# Patient Record
Sex: MALE | Race: WHITE | URBAN - METROPOLITAN AREA
[De-identification: names, ages, dates, MRNs, and addresses within clinical notes are randomized per-mention and may not be internally consistent; named-entity substitution may affect disease eponyms.]

---

## 2022-05-16 ENCOUNTER — OFFICE VISIT (OUTPATIENT)
Dept: CARDIOLOGY CLINIC | Age: 52
End: 2022-05-16
Payer: COMMERCIAL

## 2022-05-16 ENCOUNTER — TELEPHONE (OUTPATIENT)
Dept: CARDIOLOGY CLINIC | Age: 52
End: 2022-05-16

## 2022-05-16 VITALS
WEIGHT: 177 LBS | BODY MASS INDEX: 24.78 KG/M2 | DIASTOLIC BLOOD PRESSURE: 70 MMHG | RESPIRATION RATE: 18 BRPM | SYSTOLIC BLOOD PRESSURE: 136 MMHG | OXYGEN SATURATION: 99 % | HEIGHT: 71 IN | HEART RATE: 90 BPM

## 2022-05-16 DIAGNOSIS — H33.21 RIGHT RETINAL DETACHMENT: ICD-10-CM

## 2022-05-16 DIAGNOSIS — I65.29: Primary | ICD-10-CM

## 2022-05-16 DIAGNOSIS — E10.9 INSULIN DEPENDENT DIABETES MELLITUS TYPE IA (HCC): ICD-10-CM

## 2022-05-16 DIAGNOSIS — R06.09 DOE (DYSPNEA ON EXERTION): ICD-10-CM

## 2022-05-16 PROCEDURE — 99204 OFFICE O/P NEW MOD 45 MIN: CPT | Performed by: SPECIALIST

## 2022-05-16 PROCEDURE — 93000 ELECTROCARDIOGRAM COMPLETE: CPT | Performed by: SPECIALIST

## 2022-05-16 RX ORDER — ATORVASTATIN CALCIUM 10 MG/1
10 TABLET, FILM COATED ORAL DAILY
COMMUNITY
Start: 2022-05-13

## 2022-05-16 RX ORDER — BLOOD-GLUCOSE TRANSMITTER
EACH MISCELLANEOUS SEE ADMIN INSTRUCTIONS
COMMUNITY
Start: 2022-05-13

## 2022-05-16 RX ORDER — FENOFIBRATE 145 MG/1
145 TABLET, COATED ORAL DAILY
COMMUNITY

## 2022-05-16 RX ORDER — BLOOD-GLUCOSE SENSOR
EACH MISCELLANEOUS
COMMUNITY
Start: 2022-05-13

## 2022-05-16 RX ORDER — INSULIN LISPRO 100 [IU]/ML
INJECTION, SOLUTION INTRAVENOUS; SUBCUTANEOUS
COMMUNITY
Start: 2022-03-15

## 2022-05-16 RX ORDER — LEVOTHYROXINE SODIUM 150 MCG
TABLET ORAL
COMMUNITY
Start: 2022-05-13

## 2022-05-16 NOTE — PROGRESS NOTES
HISTORY OF PRESENT ILLNESS  Reed Cast is a 46 y.o. male. He has a history of diabetes since age 9 on insulin. In January he had a right retinal detachment. He then subsequently had some transient decrease in vision in that eye. There was question that he might be having some embolic phenomenon causing vascular issues. Because of his diabetes he has been tried in the past on lisinopril but could not tolerate the medication due to low blood pressure at home. He does not smoke cigarettes or drink alcohol and denies palpitations. 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 He thinks his hemoglobin A1c is around 7. HPI  Patient Active Problem List   Diagnosis Code    Insulin dependent diabetes mellitus type IA (HCC) E10.9    Right retinal detachment H33.21     Current Outpatient Medications   Medication Sig Dispense Refill    fenofibrate nanocrystallized (TRICOR) 145 mg tablet Take 145 mg by mouth daily.  atorvastatin (LIPITOR) 10 mg tablet Take 10 mg by mouth daily.  Dexcom G6 Sensor hansel USE AS DIRECTED EVERY 10 DAYS      Dexcom G6 Transmitter hansel See Admin Instructions.  HumaLOG U-100 Insulin 100 unit/mL injection       Synthroid 150 mcg tablet        Past Medical History:   Diagnosis Date    Diabetes ([facility] [address]        No past surgical history on file. Review of Systems   Eyes: Positive for blurred vision. All other systems reviewed and are negative. Visit Vitals  /70 (BP 1 Location: Right arm, BP Patient Position: Sitting, BP Cuff Size: Adult)   Pulse 90   Resp 18   Ht 5' 11\" (1.803 m)   Wt 177 lb (80.3 kg)   SpO2 99%   BMI 24.69 kg/m²       Physical Exam  Vitals and nursing note reviewed. Constitutional:       Appearance: He is normal weight. HENT:      Head: Normocephalic.       Right Ear: External ear normal.      Left Ear: External ear normal.      Nose: Nose normal. Mouth/Throat:      Mouth: Mucous membranes are moist.   Eyes:      Extraocular Movements: Extraocular movements intact. Cardiovascular:      Rate and Rhythm: Normal rate and regular rhythm. Heart sounds: Normal heart sounds. Pulmonary:      Breath sounds: Normal breath sounds. Abdominal:      Palpations: Abdomen is soft. Musculoskeletal:         General: Normal range of motion. Cervical back: Normal range of motion. Skin:     General: Skin is warm. Neurological:      Mental Status: He is alert and oriented to person, place, and time. Psychiatric:         Behavior: Behavior normal.         ASSESSMENT and PLAN  His EKG and exam are unremarkable. It is possible that he has had repetitive embolic phenomenon to the right eye but I doubt it. Nevertheless I will have him return for a transthoracic echocardiogram and carotid duplex examination we will call him regarding the results and any potential need for follow-up.

## 2022-06-22 ENCOUNTER — ANCILLARY PROCEDURE (OUTPATIENT)
Dept: CARDIOLOGY CLINIC | Age: 52
End: 2022-06-22
Payer: COMMERCIAL

## 2022-06-22 ENCOUNTER — ANCILLARY PROCEDURE (OUTPATIENT)
Dept: CARDIOLOGY CLINIC | Age: 52
End: 2022-06-22

## 2022-06-22 VITALS
WEIGHT: 177 LBS | DIASTOLIC BLOOD PRESSURE: 70 MMHG | SYSTOLIC BLOOD PRESSURE: 136 MMHG | BODY MASS INDEX: 24.78 KG/M2 | HEIGHT: 71 IN

## 2022-06-22 DIAGNOSIS — R06.09 DOE (DYSPNEA ON EXERTION): ICD-10-CM

## 2022-06-22 DIAGNOSIS — I65.29: ICD-10-CM

## 2022-06-22 PROCEDURE — 93880 EXTRACRANIAL BILAT STUDY: CPT | Performed by: SPECIALIST

## 2022-06-22 PROCEDURE — 93306 TTE W/DOPPLER COMPLETE: CPT | Performed by: SPECIALIST

## 2022-06-23 ENCOUNTER — TELEPHONE (OUTPATIENT)
Dept: CARDIOLOGY CLINIC | Age: 52
End: 2022-06-23

## 2022-06-23 LAB
ECHO AO ROOT DIAM: 3.1 CM
ECHO AO ROOT INDEX: 1.55 CM/M2
ECHO AV PEAK GRADIENT: 7 MMHG
ECHO AV PEAK VELOCITY: 1.3 M/S
ECHO AV VELOCITY RATIO: 0.62
ECHO LA DIAMETER INDEX: 1.8 CM/M2
ECHO LA DIAMETER: 3.6 CM
ECHO LA TO AORTIC ROOT RATIO: 1.16
ECHO LA VOL 2C: 39 ML (ref 18–58)
ECHO LA VOL 4C: 53 ML (ref 18–58)
ECHO LA VOL BP: 46 ML (ref 18–58)
ECHO LA VOL/BSA BIPLANE: 23 ML/M2 (ref 16–34)
ECHO LA VOLUME AREA LENGTH: 52 ML
ECHO LA VOLUME INDEX A2C: 20 ML/M2 (ref 16–34)
ECHO LA VOLUME INDEX A4C: 27 ML/M2 (ref 16–34)
ECHO LA VOLUME INDEX AREA LENGTH: 26 ML/M2 (ref 16–34)
ECHO LV E' LATERAL VELOCITY: 12 CM/S
ECHO LV E' SEPTAL VELOCITY: 8 CM/S
ECHO LV FRACTIONAL SHORTENING: 36 % (ref 28–44)
ECHO LV INTERNAL DIMENSION DIASTOLE INDEX: 2.25 CM/M2
ECHO LV INTERNAL DIMENSION DIASTOLIC: 4.5 CM (ref 4.2–5.9)
ECHO LV INTERNAL DIMENSION SYSTOLIC INDEX: 1.45 CM/M2
ECHO LV INTERNAL DIMENSION SYSTOLIC: 2.9 CM
ECHO LV ISOVOLUMETRIC RELAXATION TIME (IVRT): 70.4 MS
ECHO LV IVSD: 0.9 CM (ref 0.6–1)
ECHO LV MASS 2D: 142.9 G (ref 88–224)
ECHO LV MASS INDEX 2D: 71.4 G/M2 (ref 49–115)
ECHO LV POSTERIOR WALL DIASTOLIC: 1 CM (ref 0.6–1)
ECHO LV RELATIVE WALL THICKNESS RATIO: 0.44
ECHO LVOT PEAK GRADIENT: 3 MMHG
ECHO LVOT PEAK VELOCITY: 0.8 M/S
ECHO MV "A" WAVE DURATION: 154.1 MSEC
ECHO MV A VELOCITY: 0.69 M/S
ECHO MV E DECELERATION TIME (DT): 218.4 MS
ECHO MV E VELOCITY: 0.58 M/S
ECHO MV E/A RATIO: 0.84
ECHO MV E/E' LATERAL: 4.83
ECHO MV E/E' RATIO (AVERAGED): 6.04
ECHO MV E/E' SEPTAL: 7.25
ECHO RV FREE WALL PEAK S': 16 CM/S
ECHO RV TAPSE: 2.8 CM (ref 1.7–?)
LEFT CCA DIST DIAS: 28.1 CM/S
LEFT CCA DIST SYS: 109 CM/S
LEFT CCA PROX DIAS: 19.5 CM/S
LEFT CCA PROX SYS: 102.9 CM/S
LEFT ECA DIAS: 8.5 CM/S
LEFT ECA SYS: 107.8 CM/S
LEFT ICA DIST DIAS: 34.6 CM/S
LEFT ICA DIST SYS: 90.1 CM/S
LEFT ICA MID DIAS: 24.4 CM/S
LEFT ICA MID SYS: 77.1 CM/S
LEFT ICA PROX DIAS: 18.3 CM/S
LEFT ICA PROX SYS: 73.4 CM/S
LEFT ICA/CCA SYS: 0.8 NO UNITS
LEFT VERTEBRAL DIAS: 20.6 CM/S
LEFT VERTEBRAL SYS: 50.7 CM/S
RIGHT CCA DIST DIAS: 13.4 CM/S
RIGHT CCA DIST SYS: 80.9 CM/S
RIGHT CCA PROX DIAS: 19.9 CM/S
RIGHT CCA PROX SYS: 125.8 CM/S
RIGHT ECA DIAS: 4.8 CM/S
RIGHT ECA SYS: 83.4 CM/S
RIGHT ICA DIST DIAS: 35.5 CM/S
RIGHT ICA DIST SYS: 87 CM/S
RIGHT ICA MID DIAS: 19.5 CM/S
RIGHT ICA MID SYS: 68.7 CM/S
RIGHT ICA PROX DIAS: 18.3 CM/S
RIGHT ICA PROX SYS: 74.8 CM/S
RIGHT ICA/CCA SYS: 0.7 NO UNITS
RIGHT VERTEBRAL DIAS: 15.8 CM/S
RIGHT VERTEBRAL SYS: 61.2 CM/S

## 2022-06-23 NOTE — TELEPHONE ENCOUNTER
----- Message from Thang Blackburn MD sent at 6/23/2022  1:05 PM EDT -----  Echo and carotids normal, no evidence for cardiovascular source causing recent episode

## 2023-10-10 ENCOUNTER — APPOINTMENT (OUTPATIENT)
Facility: HOSPITAL | Age: 53
End: 2023-10-10
Payer: COMMERCIAL

## 2023-10-10 ENCOUNTER — HOSPITAL ENCOUNTER (INPATIENT)
Facility: HOSPITAL | Age: 53
LOS: 3 days | Discharge: HOME HEALTH | End: 2023-10-13
Attending: STUDENT IN AN ORGANIZED HEALTH CARE EDUCATION/TRAINING PROGRAM | Admitting: INTERNAL MEDICINE
Payer: COMMERCIAL

## 2023-10-10 DIAGNOSIS — T68.XXXA HYPOTHERMIA, INITIAL ENCOUNTER: ICD-10-CM

## 2023-10-10 DIAGNOSIS — R79.89 ELEVATED TSH: ICD-10-CM

## 2023-10-10 DIAGNOSIS — R94.31 ABNORMAL ECG: Primary | ICD-10-CM

## 2023-10-10 DIAGNOSIS — R41.82 ALTERED MENTAL STATUS, UNSPECIFIED ALTERED MENTAL STATUS TYPE: ICD-10-CM

## 2023-10-10 DIAGNOSIS — R65.21 SEPTIC SHOCK (HCC): ICD-10-CM

## 2023-10-10 DIAGNOSIS — E87.5 HYPERKALEMIA: ICD-10-CM

## 2023-10-10 DIAGNOSIS — A41.9 SEPTIC SHOCK (HCC): ICD-10-CM

## 2023-10-10 DIAGNOSIS — E10.11 TYPE 1 DIABETES MELLITUS WITH KETOACIDOTIC COMA (HCC): ICD-10-CM

## 2023-10-10 DIAGNOSIS — N17.9 AKI (ACUTE KIDNEY INJURY) (HCC): ICD-10-CM

## 2023-10-10 DIAGNOSIS — I95.9 HYPOTENSION, UNSPECIFIED HYPOTENSION TYPE: ICD-10-CM

## 2023-10-10 DIAGNOSIS — S91.309A OPEN WOUND OF FOOT EXCLUDING TOES: ICD-10-CM

## 2023-10-10 LAB
ALBUMIN SERPL-MCNC: 3.5 G/DL (ref 3.5–5)
ALBUMIN/GLOB SERPL: 0.9 (ref 1.1–2.2)
ALP SERPL-CCNC: 124 U/L (ref 45–117)
ALT SERPL-CCNC: 30 U/L (ref 12–78)
AMPHET UR QL SCN: NEGATIVE
ANION GAP BLD CALC-SCNC: ABNORMAL MMOL/L (ref 10–20)
ANION GAP SERPL CALC-SCNC: 14 MMOL/L (ref 5–15)
ANION GAP SERPL CALC-SCNC: 25 MMOL/L (ref 5–15)
ANION GAP SERPL CALC-SCNC: 35 MMOL/L (ref 5–15)
ANION GAP SERPL CALC-SCNC: ABNORMAL MMOL/L (ref 5–15)
APAP SERPL-MCNC: <2 UG/ML (ref 10–30)
APPEARANCE UR: CLEAR
AST SERPL-CCNC: 22 U/L (ref 15–37)
BACTERIA URNS QL MICRO: NEGATIVE /HPF
BARBITURATES UR QL SCN: NEGATIVE
BASE DEFICIT BLDV-SCNC: 29.5 MMOL/L
BASOPHILS # BLD: 0 K/UL (ref 0–0.1)
BASOPHILS NFR BLD: 0 % (ref 0–1)
BENZODIAZ UR QL: NEGATIVE
BILIRUB SERPL-MCNC: 0.5 MG/DL (ref 0.2–1)
BILIRUB UR QL: NEGATIVE
BUN SERPL-MCNC: 57 MG/DL (ref 6–20)
BUN SERPL-MCNC: 61 MG/DL (ref 6–20)
BUN SERPL-MCNC: 65 MG/DL (ref 6–20)
BUN SERPL-MCNC: 65 MG/DL (ref 6–20)
BUN/CREAT SERPL: 20 (ref 12–20)
BUN/CREAT SERPL: 20 (ref 12–20)
BUN/CREAT SERPL: 21 (ref 12–20)
BUN/CREAT SERPL: 22 (ref 12–20)
CA-I BLD-MCNC: 1.13 MMOL/L (ref 1.12–1.32)
CALCIUM SERPL-MCNC: 8.7 MG/DL (ref 8.5–10.1)
CALCIUM SERPL-MCNC: 8.9 MG/DL (ref 8.5–10.1)
CALCIUM SERPL-MCNC: 9 MG/DL (ref 8.5–10.1)
CALCIUM SERPL-MCNC: 9.2 MG/DL (ref 8.5–10.1)
CANNABINOIDS UR QL SCN: NEGATIVE
CHLORIDE BLD-SCNC: 96 MMOL/L (ref 98–107)
CHLORIDE SERPL-SCNC: 101 MMOL/L (ref 97–108)
CHLORIDE SERPL-SCNC: 85 MMOL/L (ref 97–108)
CHLORIDE SERPL-SCNC: 85 MMOL/L (ref 97–108)
CHLORIDE SERPL-SCNC: 94 MMOL/L (ref 97–108)
CK SERPL-CCNC: 254 U/L (ref 39–308)
CK SERPL-CCNC: 638 U/L (ref 39–308)
CO2 BLD-SCNC: <5 MMOL/L (ref 21–32)
CO2 SERPL-SCNC: 13 MMOL/L (ref 21–32)
CO2 SERPL-SCNC: 23 MMOL/L (ref 21–32)
CO2 SERPL-SCNC: 5 MMOL/L (ref 21–32)
CO2 SERPL-SCNC: <5 MMOL/L (ref 21–32)
COCAINE UR QL SCN: NEGATIVE
COLOR UR: ABNORMAL
CORTIS SERPL-MCNC: 86.3 UG/DL
CREAT BLD-MCNC: 2.55 MG/DL (ref 0.6–1.3)
CREAT SERPL-MCNC: 2.61 MG/DL (ref 0.7–1.3)
CREAT SERPL-MCNC: 2.91 MG/DL (ref 0.7–1.3)
CREAT SERPL-MCNC: 3.18 MG/DL (ref 0.7–1.3)
CREAT SERPL-MCNC: 3.28 MG/DL (ref 0.7–1.3)
DIFFERENTIAL METHOD BLD: ABNORMAL
EKG ATRIAL RATE: 70 BPM
EKG DIAGNOSIS: NORMAL
EKG P AXIS: 65 DEGREES
EKG P-R INTERVAL: 188 MS
EKG Q-T INTERVAL: 466 MS
EKG QRS DURATION: 128 MS
EKG QTC CALCULATION (BAZETT): 503 MS
EKG R AXIS: 95 DEGREES
EKG T AXIS: -10 DEGREES
EKG VENTRICULAR RATE: 70 BPM
EOSINOPHIL # BLD: 0 K/UL (ref 0–0.4)
EOSINOPHIL NFR BLD: 0 % (ref 0–7)
EPITH CASTS URNS QL MICRO: ABNORMAL /LPF
ERYTHROCYTE [DISTWIDTH] IN BLOOD BY AUTOMATED COUNT: 12.2 % (ref 11.5–14.5)
ETHANOL SERPL-MCNC: <10 MG/DL (ref 0–0.08)
GLOBULIN SER CALC-MCNC: 4.1 G/DL (ref 2–4)
GLUCOSE BLD STRIP.AUTO-MCNC: 371 MG/DL (ref 65–117)
GLUCOSE BLD STRIP.AUTO-MCNC: 476 MG/DL (ref 65–117)
GLUCOSE BLD STRIP.AUTO-MCNC: >600 MG/DL (ref 65–117)
GLUCOSE BLD-MCNC: >700 MG/DL (ref 65–100)
GLUCOSE SERPL-MCNC: 1204 MG/DL (ref 65–100)
GLUCOSE SERPL-MCNC: 1275 MG/DL (ref 65–100)
GLUCOSE SERPL-MCNC: 481 MG/DL (ref 65–100)
GLUCOSE SERPL-MCNC: 859 MG/DL (ref 65–100)
GLUCOSE UR STRIP.AUTO-MCNC: >1000 MG/DL
HCO3 BLDV-SCNC: 3.7 MMOL/L (ref 23–28)
HCT VFR BLD AUTO: 38.3 % (ref 36.6–50.3)
HGB BLD-MCNC: 11.4 G/DL (ref 12.1–17)
HGB UR QL STRIP: ABNORMAL
IMM GRANULOCYTES # BLD AUTO: 0 K/UL
IMM GRANULOCYTES NFR BLD AUTO: 0 %
INR PPP: 1.1 (ref 0.9–1.1)
KETONES UR QL STRIP.AUTO: 40 MG/DL
KETONES UR QL: 40 MG/DL
LACTATE SERPL-SCNC: 2.2 MMOL/L (ref 0.4–2)
LACTATE SERPL-SCNC: 2.4 MMOL/L (ref 0.4–2)
LACTATE SERPL-SCNC: 2.5 MMOL/L (ref 0.4–2)
LACTATE SERPL-SCNC: 5.6 MMOL/L (ref 0.4–2)
LACTATE SERPL-SCNC: 7.9 MMOL/L (ref 0.4–2)
LEUKOCYTE ESTERASE UR QL STRIP.AUTO: NEGATIVE
LYMPHOCYTES # BLD: 2.4 K/UL (ref 0.8–3.5)
LYMPHOCYTES NFR BLD: 11 % (ref 12–49)
Lab: NORMAL
MAGNESIUM SERPL-MCNC: 2.5 MG/DL (ref 1.6–2.4)
MAGNESIUM SERPL-MCNC: 3 MG/DL (ref 1.6–2.4)
MCH RBC QN AUTO: 30.2 PG (ref 26–34)
MCHC RBC AUTO-ENTMCNC: 29.8 G/DL (ref 30–36.5)
MCV RBC AUTO: 101.3 FL (ref 80–99)
METAMYELOCYTES NFR BLD MANUAL: 5 %
METHADONE UR QL: NEGATIVE
MONOCYTES # BLD: 1.5 K/UL (ref 0–1)
MONOCYTES NFR BLD: 7 % (ref 5–13)
NEUTS SEG # BLD: 16.8 K/UL (ref 1.8–8)
NEUTS SEG NFR BLD: 77 % (ref 32–75)
NITRITE UR QL STRIP.AUTO: NEGATIVE
NRBC # BLD: 0 K/UL (ref 0–0.01)
NRBC BLD-RTO: 0 PER 100 WBC
OPIATES UR QL: NEGATIVE
PCO2 BLDV: 22 MMHG (ref 41–51)
PCP UR QL: NEGATIVE
PH BLDV: 6.83 (ref 7.32–7.42)
PH UR STRIP: 5.5 (ref 5–8)
PHOSPHATE SERPL-MCNC: 0.8 MG/DL (ref 2.6–4.7)
PHOSPHATE SERPL-MCNC: >13.5 MG/DL (ref 2.6–4.7)
PLATELET # BLD AUTO: 331 K/UL (ref 150–400)
PMV BLD AUTO: 11 FL (ref 8.9–12.9)
PO2 BLDV: 67 MMHG (ref 25–40)
POTASSIUM BLD-SCNC: 6.7 MMOL/L (ref 3.5–5.1)
POTASSIUM SERPL-SCNC: 2.8 MMOL/L (ref 3.5–5.1)
POTASSIUM SERPL-SCNC: 3.4 MMOL/L (ref 3.5–5.1)
POTASSIUM SERPL-SCNC: 6.7 MMOL/L (ref 3.5–5.1)
POTASSIUM SERPL-SCNC: 6.8 MMOL/L (ref 3.5–5.1)
PROCALCITONIN SERPL-MCNC: 4.49 NG/ML
PROCALCITONIN SERPL-MCNC: 5.68 NG/ML
PROT SERPL-MCNC: 7.6 G/DL (ref 6.4–8.2)
PROT UR STRIP-MCNC: ABNORMAL MG/DL
PROTHROMBIN TIME: 11 SEC (ref 9–11.1)
RBC # BLD AUTO: 3.78 M/UL (ref 4.1–5.7)
RBC #/AREA URNS HPF: ABNORMAL /HPF (ref 0–5)
RBC MORPH BLD: ABNORMAL
SALICYLATES SERPL-MCNC: 6.3 MG/DL (ref 2.8–20)
SAO2 % BLDV: 72.3 % (ref 65–88)
SERVICE CMNT-IMP: ABNORMAL
SODIUM BLD-SCNC: 120 MMOL/L (ref 136–145)
SODIUM SERPL-SCNC: 125 MMOL/L (ref 136–145)
SODIUM SERPL-SCNC: 127 MMOL/L (ref 136–145)
SODIUM SERPL-SCNC: 132 MMOL/L (ref 136–145)
SODIUM SERPL-SCNC: 138 MMOL/L (ref 136–145)
SP GR UR REFRACTOMETRY: 1.02 (ref 1–1.03)
SPECIMEN TYPE: ABNORMAL
T3FREE SERPL-MCNC: 1.3 PG/ML (ref 2.2–4)
T4 FREE SERPL-MCNC: 1 NG/DL (ref 0.8–1.5)
TROPONIN I SERPL HS-MCNC: 1277 NG/L (ref 0–76)
TROPONIN I SERPL HS-MCNC: 360 NG/L (ref 0–76)
TROPONIN I SERPL HS-MCNC: 5504 NG/L (ref 0–76)
TROPONIN I SERPL HS-MCNC: ABNORMAL NG/L (ref 0–76)
TSH SERPL DL<=0.05 MIU/L-ACNC: 10.57 UIU/ML (ref 0.36–3.74)
URINE CULTURE IF INDICATED: ABNORMAL
UROBILINOGEN UR QL STRIP.AUTO: 0.2 EU/DL (ref 0.2–1)
WBC # BLD AUTO: 21.8 K/UL (ref 4.1–11.1)
WBC URNS QL MICRO: ABNORMAL /HPF (ref 0–4)

## 2023-10-10 PROCEDURE — 2580000003 HC RX 258: Performed by: STUDENT IN AN ORGANIZED HEALTH CARE EDUCATION/TRAINING PROGRAM

## 2023-10-10 PROCEDURE — 2500000003 HC RX 250 WO HCPCS: Performed by: INTERNAL MEDICINE

## 2023-10-10 PROCEDURE — 99221 1ST HOSP IP/OBS SF/LOW 40: CPT | Performed by: CLINICAL NURSE SPECIALIST

## 2023-10-10 PROCEDURE — A4216 STERILE WATER/SALINE, 10 ML: HCPCS | Performed by: NURSE PRACTITIONER

## 2023-10-10 PROCEDURE — 6360000002 HC RX W HCPCS: Performed by: NURSE PRACTITIONER

## 2023-10-10 PROCEDURE — 82962 GLUCOSE BLOOD TEST: CPT

## 2023-10-10 PROCEDURE — 84443 ASSAY THYROID STIM HORMONE: CPT

## 2023-10-10 PROCEDURE — 84145 PROCALCITONIN (PCT): CPT

## 2023-10-10 PROCEDURE — 70450 CT HEAD/BRAIN W/O DYE: CPT

## 2023-10-10 PROCEDURE — 93005 ELECTROCARDIOGRAM TRACING: CPT | Performed by: NURSE PRACTITIONER

## 2023-10-10 PROCEDURE — 84100 ASSAY OF PHOSPHORUS: CPT

## 2023-10-10 PROCEDURE — 82533 TOTAL CORTISOL: CPT

## 2023-10-10 PROCEDURE — 83605 ASSAY OF LACTIC ACID: CPT

## 2023-10-10 PROCEDURE — 93005 ELECTROCARDIOGRAM TRACING: CPT | Performed by: STUDENT IN AN ORGANIZED HEALTH CARE EDUCATION/TRAINING PROGRAM

## 2023-10-10 PROCEDURE — 85025 COMPLETE CBC W/AUTO DIFF WBC: CPT

## 2023-10-10 PROCEDURE — 84439 ASSAY OF FREE THYROXINE: CPT

## 2023-10-10 PROCEDURE — 2000000000 HC ICU R&B

## 2023-10-10 PROCEDURE — 6360000002 HC RX W HCPCS: Performed by: STUDENT IN AN ORGANIZED HEALTH CARE EDUCATION/TRAINING PROGRAM

## 2023-10-10 PROCEDURE — 81001 URINALYSIS AUTO W/SCOPE: CPT

## 2023-10-10 PROCEDURE — 73630 X-RAY EXAM OF FOOT: CPT

## 2023-10-10 PROCEDURE — 83735 ASSAY OF MAGNESIUM: CPT

## 2023-10-10 PROCEDURE — 2500000003 HC RX 250 WO HCPCS: Performed by: NURSE PRACTITIONER

## 2023-10-10 PROCEDURE — 80143 DRUG ASSAY ACETAMINOPHEN: CPT

## 2023-10-10 PROCEDURE — 74176 CT ABD & PELVIS W/O CONTRAST: CPT

## 2023-10-10 PROCEDURE — 6370000000 HC RX 637 (ALT 250 FOR IP): Performed by: STUDENT IN AN ORGANIZED HEALTH CARE EDUCATION/TRAINING PROGRAM

## 2023-10-10 PROCEDURE — 85610 PROTHROMBIN TIME: CPT

## 2023-10-10 PROCEDURE — 36415 COLL VENOUS BLD VENIPUNCTURE: CPT

## 2023-10-10 PROCEDURE — 51702 INSERT TEMP BLADDER CATH: CPT

## 2023-10-10 PROCEDURE — 80047 BASIC METABLC PNL IONIZED CA: CPT

## 2023-10-10 PROCEDURE — 96365 THER/PROPH/DIAG IV INF INIT: CPT

## 2023-10-10 PROCEDURE — 99285 EMERGENCY DEPT VISIT HI MDM: CPT

## 2023-10-10 PROCEDURE — 82077 ASSAY SPEC XCP UR&BREATH IA: CPT

## 2023-10-10 PROCEDURE — 71045 X-RAY EXAM CHEST 1 VIEW: CPT

## 2023-10-10 PROCEDURE — 2580000003 HC RX 258: Performed by: NURSE PRACTITIONER

## 2023-10-10 PROCEDURE — 80307 DRUG TEST PRSMV CHEM ANLYZR: CPT

## 2023-10-10 PROCEDURE — 2580000003 HC RX 258: Performed by: INTERNAL MEDICINE

## 2023-10-10 PROCEDURE — 80179 DRUG ASSAY SALICYLATE: CPT

## 2023-10-10 PROCEDURE — 80053 COMPREHEN METABOLIC PANEL: CPT

## 2023-10-10 PROCEDURE — 82803 BLOOD GASES ANY COMBINATION: CPT

## 2023-10-10 PROCEDURE — 96361 HYDRATE IV INFUSION ADD-ON: CPT

## 2023-10-10 PROCEDURE — 84484 ASSAY OF TROPONIN QUANT: CPT

## 2023-10-10 PROCEDURE — 87040 BLOOD CULTURE FOR BACTERIA: CPT

## 2023-10-10 PROCEDURE — 80048 BASIC METABOLIC PNL TOTAL CA: CPT

## 2023-10-10 PROCEDURE — 82550 ASSAY OF CK (CPK): CPT

## 2023-10-10 PROCEDURE — 84481 FREE ASSAY (FT-3): CPT

## 2023-10-10 PROCEDURE — 81002 URINALYSIS NONAUTO W/O SCOPE: CPT

## 2023-10-10 PROCEDURE — 2500000003 HC RX 250 WO HCPCS: Performed by: STUDENT IN AN ORGANIZED HEALTH CARE EDUCATION/TRAINING PROGRAM

## 2023-10-10 PROCEDURE — 93010 ELECTROCARDIOGRAM REPORT: CPT | Performed by: INTERNAL MEDICINE

## 2023-10-10 PROCEDURE — 96375 TX/PRO/DX INJ NEW DRUG ADDON: CPT

## 2023-10-10 RX ORDER — SODIUM CHLORIDE 9 MG/ML
INJECTION, SOLUTION INTRAVENOUS CONTINUOUS
Status: DISCONTINUED | OUTPATIENT
Start: 2023-10-10 | End: 2023-10-10

## 2023-10-10 RX ORDER — SODIUM CHLORIDE, SODIUM LACTATE, POTASSIUM CHLORIDE, AND CALCIUM CHLORIDE .6; .31; .03; .02 G/100ML; G/100ML; G/100ML; G/100ML
1000 INJECTION, SOLUTION INTRAVENOUS ONCE
Status: COMPLETED | OUTPATIENT
Start: 2023-10-10 | End: 2023-10-10

## 2023-10-10 RX ORDER — ACETAMINOPHEN 650 MG/1
650 SUPPOSITORY RECTAL EVERY 6 HOURS PRN
Status: DISCONTINUED | OUTPATIENT
Start: 2023-10-10 | End: 2023-10-13 | Stop reason: HOSPADM

## 2023-10-10 RX ORDER — SODIUM CHLORIDE 0.9 % (FLUSH) 0.9 %
5-40 SYRINGE (ML) INJECTION EVERY 12 HOURS SCHEDULED
Status: DISCONTINUED | OUTPATIENT
Start: 2023-10-10 | End: 2023-10-13 | Stop reason: HOSPADM

## 2023-10-10 RX ORDER — POTASSIUM CHLORIDE 7.45 MG/ML
10 INJECTION INTRAVENOUS
Status: DISCONTINUED | OUTPATIENT
Start: 2023-10-10 | End: 2023-10-11

## 2023-10-10 RX ORDER — CALCIUM GLUCONATE 94 MG/ML
2000 INJECTION, SOLUTION INTRAVENOUS ONCE
Status: DISCONTINUED | OUTPATIENT
Start: 2023-10-10 | End: 2023-10-10 | Stop reason: SDUPTHER

## 2023-10-10 RX ORDER — SODIUM CHLORIDE, SODIUM LACTATE, POTASSIUM CHLORIDE, AND CALCIUM CHLORIDE .6; .31; .03; .02 G/100ML; G/100ML; G/100ML; G/100ML
600 INJECTION, SOLUTION INTRAVENOUS ONCE
Status: COMPLETED | OUTPATIENT
Start: 2023-10-10 | End: 2023-10-10

## 2023-10-10 RX ORDER — ONDANSETRON 4 MG/1
4 TABLET, ORALLY DISINTEGRATING ORAL EVERY 8 HOURS PRN
Status: DISCONTINUED | OUTPATIENT
Start: 2023-10-10 | End: 2023-10-13 | Stop reason: HOSPADM

## 2023-10-10 RX ORDER — NOREPINEPHRINE BITARTRATE 0.06 MG/ML
1-100 INJECTION, SOLUTION INTRAVENOUS CONTINUOUS
Status: DISCONTINUED | OUTPATIENT
Start: 2023-10-10 | End: 2023-10-10 | Stop reason: SDUPTHER

## 2023-10-10 RX ORDER — SODIUM CHLORIDE 0.9 % (FLUSH) 0.9 %
5-40 SYRINGE (ML) INJECTION PRN
Status: DISCONTINUED | OUTPATIENT
Start: 2023-10-10 | End: 2023-10-13 | Stop reason: HOSPADM

## 2023-10-10 RX ORDER — DEXTROSE AND SODIUM CHLORIDE 5; .45 G/100ML; G/100ML
INJECTION, SOLUTION INTRAVENOUS CONTINUOUS PRN
Status: DISCONTINUED | OUTPATIENT
Start: 2023-10-10 | End: 2023-10-13 | Stop reason: HOSPADM

## 2023-10-10 RX ORDER — CALCIUM GLUCONATE 20 MG/ML
2000 INJECTION, SOLUTION INTRAVENOUS ONCE
Status: COMPLETED | OUTPATIENT
Start: 2023-10-10 | End: 2023-10-10

## 2023-10-10 RX ORDER — ONDANSETRON 2 MG/ML
4 INJECTION INTRAMUSCULAR; INTRAVENOUS EVERY 6 HOURS PRN
Status: DISCONTINUED | OUTPATIENT
Start: 2023-10-10 | End: 2023-10-13 | Stop reason: HOSPADM

## 2023-10-10 RX ORDER — SODIUM CHLORIDE 9 MG/ML
INJECTION, SOLUTION INTRAVENOUS PRN
Status: DISCONTINUED | OUTPATIENT
Start: 2023-10-10 | End: 2023-10-13 | Stop reason: HOSPADM

## 2023-10-10 RX ORDER — ACETAMINOPHEN 325 MG/1
650 TABLET ORAL EVERY 6 HOURS PRN
Status: DISCONTINUED | OUTPATIENT
Start: 2023-10-10 | End: 2023-10-13 | Stop reason: HOSPADM

## 2023-10-10 RX ORDER — MIDAZOLAM HYDROCHLORIDE 2 MG/2ML
1 INJECTION, SOLUTION INTRAMUSCULAR; INTRAVENOUS ONCE
Status: COMPLETED | OUTPATIENT
Start: 2023-10-11 | End: 2023-10-10

## 2023-10-10 RX ORDER — SODIUM CHLORIDE AND POTASSIUM CHLORIDE 150; 900 MG/100ML; MG/100ML
INJECTION, SOLUTION INTRAVENOUS CONTINUOUS
Status: DISCONTINUED | OUTPATIENT
Start: 2023-10-10 | End: 2023-10-11

## 2023-10-10 RX ORDER — ENOXAPARIN SODIUM 100 MG/ML
40 INJECTION SUBCUTANEOUS DAILY
Status: DISCONTINUED | OUTPATIENT
Start: 2023-10-10 | End: 2023-10-13 | Stop reason: HOSPADM

## 2023-10-10 RX ORDER — DEXTROSE MONOHYDRATE 100 MG/ML
INJECTION, SOLUTION INTRAVENOUS CONTINUOUS PRN
Status: DISCONTINUED | OUTPATIENT
Start: 2023-10-10 | End: 2023-10-13 | Stop reason: HOSPADM

## 2023-10-10 RX ORDER — FENTANYL CITRATE 50 UG/ML
25 INJECTION, SOLUTION INTRAMUSCULAR; INTRAVENOUS ONCE
Status: COMPLETED | OUTPATIENT
Start: 2023-10-11 | End: 2023-10-10

## 2023-10-10 RX ADMIN — VANCOMYCIN HYDROCHLORIDE 1000 MG: 1 INJECTION, POWDER, LYOPHILIZED, FOR SOLUTION INTRAVENOUS at 11:49

## 2023-10-10 RX ADMIN — POTASSIUM PHOSPHATE, MONOBASIC POTASSIUM PHOSPHATE, DIBASIC 30 MMOL: 224; 236 INJECTION, SOLUTION, CONCENTRATE INTRAVENOUS at 23:48

## 2023-10-10 RX ADMIN — POTASSIUM CHLORIDE 10 MEQ: 7.46 INJECTION, SOLUTION INTRAVENOUS at 21:29

## 2023-10-10 RX ADMIN — SODIUM CHLORIDE, PRESERVATIVE FREE 10 ML: 5 INJECTION INTRAVENOUS at 20:30

## 2023-10-10 RX ADMIN — SODIUM CHLORIDE: 9 INJECTION, SOLUTION INTRAVENOUS at 21:29

## 2023-10-10 RX ADMIN — SODIUM CHLORIDE, POTASSIUM CHLORIDE, SODIUM LACTATE AND CALCIUM CHLORIDE 1000 ML: 600; 310; 30; 20 INJECTION, SOLUTION INTRAVENOUS at 11:05

## 2023-10-10 RX ADMIN — SODIUM CHLORIDE, POTASSIUM CHLORIDE, SODIUM LACTATE AND CALCIUM CHLORIDE 1000 ML: 600; 310; 30; 20 INJECTION, SOLUTION INTRAVENOUS at 10:22

## 2023-10-10 RX ADMIN — MIDAZOLAM 1 MG: 1 INJECTION INTRAMUSCULAR; INTRAVENOUS at 23:45

## 2023-10-10 RX ADMIN — POTASSIUM CHLORIDE 10 MEQ: 7.46 INJECTION, SOLUTION INTRAVENOUS at 22:37

## 2023-10-10 RX ADMIN — WATER 2000 MG: 1 INJECTION INTRAMUSCULAR; INTRAVENOUS; SUBCUTANEOUS at 12:08

## 2023-10-10 RX ADMIN — CALCIUM GLUCONATE 2000 MG: 20 INJECTION, SOLUTION INTRAVENOUS at 11:41

## 2023-10-10 RX ADMIN — HYDROCORTISONE SODIUM SUCCINATE 100 MG: 100 INJECTION, POWDER, FOR SOLUTION INTRAMUSCULAR; INTRAVENOUS at 19:05

## 2023-10-10 RX ADMIN — SODIUM BICARBONATE 50 MEQ: 84 INJECTION, SOLUTION INTRAVENOUS at 10:56

## 2023-10-10 RX ADMIN — SODIUM CHLORIDE 32.5 UNITS/HR: 9 INJECTION, SOLUTION INTRAVENOUS at 17:52

## 2023-10-10 RX ADMIN — NOREPINEPHRINE BITARTRATE 5 MCG/MIN: 1 INJECTION, SOLUTION, CONCENTRATE INTRAVENOUS at 12:20

## 2023-10-10 RX ADMIN — SODIUM CHLORIDE 10.82 UNITS/HR: 9 INJECTION, SOLUTION INTRAVENOUS at 13:30

## 2023-10-10 RX ADMIN — POTASSIUM CHLORIDE 10 MEQ: 7.46 INJECTION, SOLUTION INTRAVENOUS at 23:48

## 2023-10-10 RX ADMIN — SODIUM BICARBONATE 50 MEQ: 84 INJECTION, SOLUTION INTRAVENOUS at 10:55

## 2023-10-10 RX ADMIN — SODIUM CHLORIDE 43.3 UNITS/HR: 9 INJECTION, SOLUTION INTRAVENOUS at 20:27

## 2023-10-10 RX ADMIN — FENTANYL CITRATE 25 MCG: 0.05 INJECTION, SOLUTION INTRAMUSCULAR; INTRAVENOUS at 23:45

## 2023-10-10 RX ADMIN — FAMOTIDINE 20 MG: 10 INJECTION, SOLUTION INTRAVENOUS at 18:06

## 2023-10-10 RX ADMIN — SODIUM BICARBONATE: 84 INJECTION, SOLUTION INTRAVENOUS at 16:19

## 2023-10-10 RX ADMIN — SODIUM CHLORIDE, POTASSIUM CHLORIDE, SODIUM LACTATE AND CALCIUM CHLORIDE 600 ML: 600; 310; 30; 20 INJECTION, SOLUTION INTRAVENOUS at 14:46

## 2023-10-10 RX ADMIN — SODIUM CHLORIDE 33.28 UNITS/HR: 9 INJECTION, SOLUTION INTRAVENOUS at 22:36

## 2023-10-10 RX ADMIN — VANCOMYCIN HYDROCHLORIDE 1000 MG: 1 INJECTION, POWDER, LYOPHILIZED, FOR SOLUTION INTRAVENOUS at 11:48

## 2023-10-10 ASSESSMENT — LIFESTYLE VARIABLES
HOW MANY STANDARD DRINKS CONTAINING ALCOHOL DO YOU HAVE ON A TYPICAL DAY: 1 OR 2
HOW OFTEN DO YOU HAVE A DRINK CONTAINING ALCOHOL: MONTHLY OR LESS

## 2023-10-10 ASSESSMENT — ENCOUNTER SYMPTOMS
CHEST TIGHTNESS: 0
SHORTNESS OF BREATH: 0
ABDOMINAL PAIN: 0

## 2023-10-10 ASSESSMENT — PAIN SCALES - GENERAL: PAINLEVEL_OUTOF10: 0

## 2023-10-10 ASSESSMENT — PAIN - FUNCTIONAL ASSESSMENT: PAIN_FUNCTIONAL_ASSESSMENT: NONE - DENIES PAIN

## 2023-10-10 NOTE — ED TRIAGE NOTES
Patient arrives via EMS with complaints of altered mental status and hyperglycemia. Patient has not been feeling well for a few days. Was found laying on the floor this morning unknown exactly how long he had been laying there only wearing shorts. Patient is type 1 diabetic. EMS started 18G IV and gave one liter of saline PTA.

## 2023-10-10 NOTE — ED NOTES
TRANSFER - OUT REPORT:    Verbal report given to Luis Felipe Shea RN on Penny Worley  being transferred to ICU for routine progression of patient care       Report consisted of patient's Situation, Background, Assessment and   Recommendations(SBAR). Information from the following report(s) Nurse Handoff Report, ED Encounter Summary, ED SBAR, Intake/Output, MAR, and Recent Results was reviewed with the receiving nurse. [facility] Assessment:    Presents to emergency department  because of falls (Syncope, seizure, or loss of consciousness): No  Age > 70: No  Altered Mental Status, Intoxication with alcohol or substance confusion (Disorientation, impaired judgment, poor safety awaremess, or inability to follow instructions): Yes  Impaired Mobility: Ambulates or transfers with assistive devices or assistance; Unable to ambulate or transer.: No  Nursing Judgement: Yes          Lines:   Peripheral IV 10/10/23 Left Antecubital (Active)       Peripheral IV 10/10/23 Right Antecubital (Active)   Site Assessment Clean, dry & intact 10/10/23 1044   Line Status Blood return noted;Brisk blood return;Normal saline locked; Flushed;Specimen collected 10/10/23 1044   Phlebitis Assessment No symptoms 10/10/23 1044   Infiltration Assessment 0 10/10/23 1044   Alcohol Cap Used No 10/10/23 1044   Dressing Status Clean, dry & intact 10/10/23 1044   Dressing Type Transparent 10/10/23 1044   Dressing Intervention New 10/10/23 1044        Opportunity for questions and clarification was provided.       Patient transported with:  Monitor and Patient-specific medications from [facility] [address] [place], [place]  10/10/23 2104 Universal Safety Interventions

## 2023-10-10 NOTE — ED PROVIDER NOTES
LR.  Also starting vasopressors currently awaiting CT for disposition [WG]   1213 Patient has a diabetic foot ulcer and wound to the left foot. Possible source of sepsis. Patient's wife also reports that he has a history of hypothyroidism and has not been taking his levothyroxine [WG]   1220 Spoke to Dr. Tammy Galaviz ICU has accepted. We will send by critical care transport [WG]      ED Course User Index  [WG] DO Russ Cooper Serve Consult for Admission  12:27 PM    ED Room Number: SER08/08  Patient Name and age:  Carrillo Lee 52 y.o.  male  Working Diagnosis:   1. Altered mental status, unspecified altered mental status type    2. ÁNGEL (acute kidney injury) ([address])    3. Hypotension, unspecified hypotension type    4. Hypothermia, initial encounter    5. Hyperkalemia    6. Type 1 diabetes mellitus with ketoacidotic coma ([address])    7. Abnormal ECG    8. Elevated TSH    9. Open wound of foot excluding toes        COVID-19 Suspicion: No  Sepsis present:  Yes  Reassessment needed: Yes  Code Status:  Full Code  Readmission: No  Isolation Requirements: no  Recommended Level of Care: ICU  Department: [facility] - [phone]  Consulting Provider: Dr Ying Pedro    Other:  Critically ill 25-year-old male who will be sent to the ICU for your critical care, came in hypotensive, hypothermic, was suspicious for DKA versus septic shock. Patient found to be in septic shock, has gotten his full 30 cc/kg bolus of IV fluids, will have started on Levophed, patient has been given hydrocortisone for possible adrenal insufficiency, possible myxedema coma. Patient has multiple electrolyte abnormalities including being acidotic with a pH of 6.8 and a glucose of 1/2/2004, possible HHS, he has creatinine of 3.28, elevated phosphorus level, magnesium level as well. His lactic acid is 7.9. His chest x-ray shows no signs of pneumonia, CT head was performed evaluate for intracranial hemorrhage given found down the ground. His CK is not elevated less likely rhabdomyolysis. I performed a CT of the abdomen pelvis without contrast to evaluate for possible sources of sepsis and his altered mental status, this read is currently pending, spoke to the ICU attending who is excepted the patient, given the patient's current status is critical he will go directly to the ICU, he has received cefepime and vancomycin for empiric antibiotic coverage. Total critical care time spent exclusive of procedures:  85 minutes. CONSULTS:  None    PROCEDURES:  Unless otherwise noted below, none     Procedures      FINAL IMPRESSION      1. Altered mental status, unspecified altered mental status type    2. ÁNGEL (acute kidney injury) ([address])    3. Hypotension, unspecified hypotension type    4. Hypothermia, initial encounter    5. Hyperkalemia    6. Type 1 diabetes mellitus with ketoacidotic coma ([address])    7. Abnormal ECG    8. Elevated TSH    9. Open wound of foot excluding toes          DISPOSITION/PLAN   DISPOSITION Decision To Admit 10/10/2023 12:20:16 PM      PATIENT REFERRED TO:  No follow-up provider specified.     DISCHARGE MEDICATIONS:  New Prescriptions    No medications on file         (Please note that portions of this note were completed with a voice recognition program.  Efforts were made to edit the dictations but occasionally words are mis-transcribed.)    Kenneth Dodson DO (electronically signed)  Emergency Attending Physician / Physician Assistant / Nurse Practitioner             Kenneth Dodson DO  10/10/23 4805

## 2023-10-10 NOTE — CONSULTS
[facility]  DIABETES MANAGEMENT CONSULT    Consulted by Provider for advanced nursing evaluation and care for inpatient blood glucose management. Evaluation and Action Plan   Sravan Olguin is a 52 y.o. male living with T1D for 50yrs (diagnosed at age 9). Admitted in severe DKA with AMS/ requiring ICU care due to severe acidosis. Insulin infusing per DKA protocol. IVF ongoing. Current A1C pending    Suprisingly patietn was able to verbalize his diabetes mgmt self care practices: had allergic reaction to CGM (dexcom G6) adhesive and had stopped using it - had taken self off his insulin pump (Tandem) 2 yrs ago for unknown reason I could not understand. Was utilizing novolog insulin at 40 units daily via correctional scale -Had stopped seeing endocrinologist around 2 yrs ago because \"he was getting on me for not being in target range when I was in target 89% of the time\". Management Rationale Action Plan   Medication  Insulin infusion per DKA protocol   Continue insulin infusion per DKA protocol  POC glucose hourly, BMP Q4h on insulin infusion  Add dextrose in IVF once glucose under 250 on insulin infusion  When anion gap closed x2 on BMP, can convert to SQ insulin. When ready to convert  Initiate the subcutaneous insulin order set with:  Basal insulin: 0.4u/kg : NPH 15 units BID  First dose now then turn off insulin gtt two hours later  Correctional insulin ACHS at LOW  sensitivity, start at a glucose of 200  Consistent Carbohydrate diet-60gm  Adjust to non-dextrose containing IVF  Bolus : once taking PO food diet: Lispro 5 units TID with meals   Additional orders          Diabetes Discharge Plan   Medication  TBD   Referral  []        Outpatient diabetes education   Additional orders            Initial Presentation   Sravan Olguin is a 52 y.o. male admitted  10/10  after experiencing AMS. EMS found down at home - +confusion.  +hyperglycemia with report of feeling strategies with patient and responsible inpatient provider   Informed patient of rational for insulin strategy while hospitalized     Nursing Diagnosis 39323 Ineffective Health Management   Nursing Intervention Domain 4975 Decision-making Support   Nursing Interventions Identified diabetes self-management practices impeding diabetes control  Discussed diabetes survival skills related to  Healthy Plate eating plan; given handouts  Role of physical activity in improving insulin sensitivity and action  Procedure for blood glucose monitoring & options for low-cost products  Medications plan at discharge     Billing Code(s)   63745    Before making these care recommendations, I personally reviewed the hospitalization record, including notes, laboratory & diagnostic data and current medications, and examined the patient at the bedside (circumstances permitting) before determining care. More than fifty (50) percent of the time was spent in patient counseling and/or care coordination.   Total minutes: [address] [address] [address], APRN - CNS  Diabetes Clinical Nurse Specialist  Program for Diabetes Health  [facility]

## 2023-10-10 NOTE — ED NOTES
Verbal report given to CCT. CCT in possession of  ED facesheet and ED Encounter.       Dalton Seals, RN  10/10/23 4644

## 2023-10-11 ENCOUNTER — APPOINTMENT (OUTPATIENT)
Facility: HOSPITAL | Age: 53
End: 2023-10-11
Payer: COMMERCIAL

## 2023-10-11 LAB
ANION GAP SERPL CALC-SCNC: 4 MMOL/L (ref 5–15)
ANION GAP SERPL CALC-SCNC: 7 MMOL/L (ref 5–15)
BASOPHILS # BLD: 0 K/UL (ref 0–0.1)
BASOPHILS NFR BLD: 0 % (ref 0–1)
BUN SERPL-MCNC: 44 MG/DL (ref 6–20)
BUN SERPL-MCNC: 46 MG/DL (ref 6–20)
BUN/CREAT SERPL: 21 (ref 12–20)
BUN/CREAT SERPL: 23 (ref 12–20)
CALCIUM SERPL-MCNC: 8.5 MG/DL (ref 8.5–10.1)
CALCIUM SERPL-MCNC: 8.9 MG/DL (ref 8.5–10.1)
CHLORIDE SERPL-SCNC: 106 MMOL/L (ref 97–108)
CHLORIDE SERPL-SCNC: 111 MMOL/L (ref 97–108)
CO2 SERPL-SCNC: 30 MMOL/L (ref 21–32)
CO2 SERPL-SCNC: 30 MMOL/L (ref 21–32)
CREAT SERPL-MCNC: 1.92 MG/DL (ref 0.7–1.3)
CREAT SERPL-MCNC: 2.21 MG/DL (ref 0.7–1.3)
DIFFERENTIAL METHOD BLD: ABNORMAL
EKG ATRIAL RATE: 90 BPM
EKG DIAGNOSIS: NORMAL
EKG P AXIS: 57 DEGREES
EKG P-R INTERVAL: 150 MS
EKG Q-T INTERVAL: 274 MS
EKG QRS DURATION: 94 MS
EKG QTC CALCULATION (BAZETT): 335 MS
EKG R AXIS: 94 DEGREES
EKG T AXIS: 215 DEGREES
EKG VENTRICULAR RATE: 90 BPM
EOSINOPHIL # BLD: 0 K/UL (ref 0–0.4)
EOSINOPHIL NFR BLD: 0 % (ref 0–7)
ERYTHROCYTE [DISTWIDTH] IN BLOOD BY AUTOMATED COUNT: 12 % (ref 11.5–14.5)
EST. AVERAGE GLUCOSE BLD GHB EST-MCNC: 289 MG/DL
GLUCOSE BLD STRIP.AUTO-MCNC: 102 MG/DL (ref 65–117)
GLUCOSE BLD STRIP.AUTO-MCNC: 104 MG/DL (ref 65–117)
GLUCOSE BLD STRIP.AUTO-MCNC: 109 MG/DL (ref 65–117)
GLUCOSE BLD STRIP.AUTO-MCNC: 113 MG/DL (ref 65–117)
GLUCOSE BLD STRIP.AUTO-MCNC: 116 MG/DL (ref 65–117)
GLUCOSE BLD STRIP.AUTO-MCNC: 119 MG/DL (ref 65–117)
GLUCOSE BLD STRIP.AUTO-MCNC: 142 MG/DL (ref 65–117)
GLUCOSE BLD STRIP.AUTO-MCNC: 195 MG/DL (ref 65–117)
GLUCOSE BLD STRIP.AUTO-MCNC: 244 MG/DL (ref 65–117)
GLUCOSE BLD STRIP.AUTO-MCNC: 248 MG/DL (ref 65–117)
GLUCOSE BLD STRIP.AUTO-MCNC: 288 MG/DL (ref 65–117)
GLUCOSE BLD STRIP.AUTO-MCNC: 294 MG/DL (ref 65–117)
GLUCOSE BLD STRIP.AUTO-MCNC: 320 MG/DL (ref 65–117)
GLUCOSE SERPL-MCNC: 135 MG/DL (ref 65–100)
GLUCOSE SERPL-MCNC: 284 MG/DL (ref 65–100)
HBA1C MFR BLD: 11.7 % (ref 4–5.6)
HCT VFR BLD AUTO: 27.4 % (ref 36.6–50.3)
HGB BLD-MCNC: 9.6 G/DL (ref 12.1–17)
IMM GRANULOCYTES # BLD AUTO: 0.1 K/UL (ref 0–0.04)
IMM GRANULOCYTES NFR BLD AUTO: 1 % (ref 0–0.5)
LACTATE SERPL-SCNC: 1.6 MMOL/L (ref 0.4–2)
LYMPHOCYTES # BLD: 0.9 K/UL (ref 0.8–3.5)
LYMPHOCYTES NFR BLD: 6 % (ref 12–49)
MAGNESIUM SERPL-MCNC: 2.4 MG/DL (ref 1.6–2.4)
MAGNESIUM SERPL-MCNC: 2.4 MG/DL (ref 1.6–2.4)
MCH RBC QN AUTO: 30.2 PG (ref 26–34)
MCHC RBC AUTO-ENTMCNC: 35 G/DL (ref 30–36.5)
MCV RBC AUTO: 86.2 FL (ref 80–99)
MONOCYTES # BLD: 1 K/UL (ref 0–1)
MONOCYTES NFR BLD: 7 % (ref 5–13)
NEUTS SEG # BLD: 12.3 K/UL (ref 1.8–8)
NEUTS SEG NFR BLD: 86 % (ref 32–75)
NRBC # BLD: 0 K/UL (ref 0–0.01)
NRBC BLD-RTO: 0 PER 100 WBC
PHOSPHATE SERPL-MCNC: 1.6 MG/DL (ref 2.6–4.7)
PHOSPHATE SERPL-MCNC: 3.4 MG/DL (ref 2.6–4.7)
PLATELET # BLD AUTO: 178 K/UL (ref 150–400)
PMV BLD AUTO: 9.8 FL (ref 8.9–12.9)
POTASSIUM SERPL-SCNC: 3.5 MMOL/L (ref 3.5–5.1)
POTASSIUM SERPL-SCNC: 4.1 MMOL/L (ref 3.5–5.1)
PROCALCITONIN SERPL-MCNC: 5.26 NG/ML
RBC # BLD AUTO: 3.18 M/UL (ref 4.1–5.7)
SERVICE CMNT-IMP: ABNORMAL
SERVICE CMNT-IMP: NORMAL
SODIUM SERPL-SCNC: 143 MMOL/L (ref 136–145)
SODIUM SERPL-SCNC: 145 MMOL/L (ref 136–145)
TROPONIN I SERPL HS-MCNC: ABNORMAL NG/L (ref 0–76)
TROPONIN I SERPL HS-MCNC: ABNORMAL NG/L (ref 0–76)
WBC # BLD AUTO: 14.3 K/UL (ref 4.1–11.1)

## 2023-10-11 PROCEDURE — 2580000003 HC RX 258: Performed by: INTERNAL MEDICINE

## 2023-10-11 PROCEDURE — A4216 STERILE WATER/SALINE, 10 ML: HCPCS | Performed by: INTERNAL MEDICINE

## 2023-10-11 PROCEDURE — 83605 ASSAY OF LACTIC ACID: CPT

## 2023-10-11 PROCEDURE — 83735 ASSAY OF MAGNESIUM: CPT

## 2023-10-11 PROCEDURE — 2500000003 HC RX 250 WO HCPCS: Performed by: INTERNAL MEDICINE

## 2023-10-11 PROCEDURE — 6360000002 HC RX W HCPCS: Performed by: NURSE PRACTITIONER

## 2023-10-11 PROCEDURE — 2580000003 HC RX 258: Performed by: NURSE PRACTITIONER

## 2023-10-11 PROCEDURE — 84145 PROCALCITONIN (PCT): CPT

## 2023-10-11 PROCEDURE — 99232 SBSQ HOSP IP/OBS MODERATE 35: CPT | Performed by: CLINICAL NURSE SPECIALIST

## 2023-10-11 PROCEDURE — 2500000003 HC RX 250 WO HCPCS: Performed by: NURSE PRACTITIONER

## 2023-10-11 PROCEDURE — 02HV33Z INSERTION OF INFUSION DEVICE INTO SUPERIOR VENA CAVA, PERCUTANEOUS APPROACH: ICD-10-PCS | Performed by: INTERNAL MEDICINE

## 2023-10-11 PROCEDURE — 71045 X-RAY EXAM CHEST 1 VIEW: CPT

## 2023-10-11 PROCEDURE — 36415 COLL VENOUS BLD VENIPUNCTURE: CPT

## 2023-10-11 PROCEDURE — 93010 ELECTROCARDIOGRAM REPORT: CPT | Performed by: INTERNAL MEDICINE

## 2023-10-11 PROCEDURE — 6370000000 HC RX 637 (ALT 250 FOR IP): Performed by: INTERNAL MEDICINE

## 2023-10-11 PROCEDURE — 83036 HEMOGLOBIN GLYCOSYLATED A1C: CPT

## 2023-10-11 PROCEDURE — 80048 BASIC METABOLIC PNL TOTAL CA: CPT

## 2023-10-11 PROCEDURE — 84100 ASSAY OF PHOSPHORUS: CPT

## 2023-10-11 PROCEDURE — 1100000000 HC RM PRIVATE

## 2023-10-11 PROCEDURE — 85025 COMPLETE CBC W/AUTO DIFF WBC: CPT

## 2023-10-11 PROCEDURE — 36556 INSERT NON-TUNNEL CV CATH: CPT

## 2023-10-11 PROCEDURE — 82962 GLUCOSE BLOOD TEST: CPT

## 2023-10-11 PROCEDURE — C1751 CATH, INF, PER/CENT/MIDLINE: HCPCS

## 2023-10-11 PROCEDURE — 92610 EVALUATE SWALLOWING FUNCTION: CPT

## 2023-10-11 PROCEDURE — 51798 US URINE CAPACITY MEASURE: CPT

## 2023-10-11 PROCEDURE — 93005 ELECTROCARDIOGRAM TRACING: CPT | Performed by: NURSE PRACTITIONER

## 2023-10-11 PROCEDURE — 84484 ASSAY OF TROPONIN QUANT: CPT

## 2023-10-11 RX ORDER — POTASSIUM CHLORIDE 29.8 MG/ML
20 INJECTION INTRAVENOUS
Status: COMPLETED | OUTPATIENT
Start: 2023-10-11 | End: 2023-10-11

## 2023-10-11 RX ORDER — GINSENG 100 MG
CAPSULE ORAL ONCE
Status: COMPLETED | OUTPATIENT
Start: 2023-10-11 | End: 2023-10-11

## 2023-10-11 RX ORDER — SENNA AND DOCUSATE SODIUM 50; 8.6 MG/1; MG/1
1 TABLET, FILM COATED ORAL 2 TIMES DAILY
Status: DISCONTINUED | OUTPATIENT
Start: 2023-10-11 | End: 2023-10-13 | Stop reason: HOSPADM

## 2023-10-11 RX ORDER — INSULIN LISPRO 100 [IU]/ML
0-8 INJECTION, SOLUTION INTRAVENOUS; SUBCUTANEOUS
Status: DISCONTINUED | OUTPATIENT
Start: 2023-10-11 | End: 2023-10-13 | Stop reason: HOSPADM

## 2023-10-11 RX ORDER — PANTOPRAZOLE SODIUM 40 MG/1
40 TABLET, DELAYED RELEASE ORAL
Status: DISCONTINUED | OUTPATIENT
Start: 2023-10-12 | End: 2023-10-13 | Stop reason: HOSPADM

## 2023-10-11 RX ORDER — INSULIN LISPRO 100 [IU]/ML
0-4 INJECTION, SOLUTION INTRAVENOUS; SUBCUTANEOUS NIGHTLY
Status: DISCONTINUED | OUTPATIENT
Start: 2023-10-11 | End: 2023-10-13 | Stop reason: HOSPADM

## 2023-10-11 RX ADMIN — DEXTROSE AND SODIUM CHLORIDE: 5; 450 INJECTION, SOLUTION INTRAVENOUS at 04:52

## 2023-10-11 RX ADMIN — SODIUM CHLORIDE, PRESERVATIVE FREE 10 ML: 5 INJECTION INTRAVENOUS at 09:05

## 2023-10-11 RX ADMIN — POTASSIUM CHLORIDE AND SODIUM CHLORIDE: 900; 150 INJECTION, SOLUTION INTRAVENOUS at 00:17

## 2023-10-11 RX ADMIN — Medication 8 UNITS: at 09:29

## 2023-10-11 RX ADMIN — BACITRACIN 1 EACH: 500 OINTMENT TOPICAL at 11:07

## 2023-10-11 RX ADMIN — ENOXAPARIN SODIUM 40 MG: 100 INJECTION SUBCUTANEOUS at 09:29

## 2023-10-11 RX ADMIN — ONDANSETRON 4 MG: 2 INJECTION INTRAMUSCULAR; INTRAVENOUS at 10:31

## 2023-10-11 RX ADMIN — POTASSIUM PHOSPHATE, MONOBASIC POTASSIUM PHOSPHATE, DIBASIC 30 MMOL: 224; 236 INJECTION, SOLUTION, CONCENTRATE INTRAVENOUS at 02:46

## 2023-10-11 RX ADMIN — Medication 1 LOZENGE: at 11:37

## 2023-10-11 RX ADMIN — Medication 8 UNITS: at 16:17

## 2023-10-11 RX ADMIN — FAMOTIDINE 20 MG: 10 INJECTION, SOLUTION INTRAVENOUS at 16:34

## 2023-10-11 RX ADMIN — POTASSIUM CHLORIDE 20 MEQ: 29.8 INJECTION, SOLUTION INTRAVENOUS at 02:03

## 2023-10-11 RX ADMIN — POTASSIUM CHLORIDE 20 MEQ: 29.8 INJECTION, SOLUTION INTRAVENOUS at 00:56

## 2023-10-11 RX ADMIN — Medication 6 UNITS: at 16:34

## 2023-10-11 RX ADMIN — DEXTROSE AND SODIUM CHLORIDE: 5; 450 INJECTION, SOLUTION INTRAVENOUS at 18:31

## 2023-10-11 ASSESSMENT — PAIN DESCRIPTION - LOCATION: LOCATION: THROAT

## 2023-10-11 ASSESSMENT — PAIN SCALES - GENERAL
PAINLEVEL_OUTOF10: 0
PAINLEVEL_OUTOF10: 4

## 2023-10-11 ASSESSMENT — PAIN DESCRIPTION - DESCRIPTORS: DESCRIPTORS: SORE

## 2023-10-11 NOTE — PROGRESS NOTES
Patient arrived to unit from ICU. Patient alert and oriented times 4. Patient in bed with bed alarm. Call bell left in patient's hand. Wife and son at bedside.

## 2023-10-11 NOTE — DIABETES MGMT
[address] [facility]  DIABETES MANAGEMENT CONSULT    Consulted by Provider for advanced nursing evaluation and care for inpatient blood glucose management. Evaluation and Action Plan   Gorge Edwards is a 52 y.o. male living with T1D for 50yrs (diagnosed at age 9). Admitted in severe DKA with AMS/ requiring ICU care due to severe acidosis. Insulin infusing per DKA protocol. IVF ongoing. Current 11.4%    Suprisingly patietn was able to verbalize his diabetes mgmt self care practices: had allergic reaction to CGM (dexcom G6) adhesive and had stopped using it - had taken self off his insulin pump (Tandem) 2 yrs ago for unknown reason I could not understand. Was utilizing novolog insulin at 40 units daily via correctional scale -Had stopped seeing endocrinologist around 2 yrs ago because \"he was getting on me for not being in target range when I was in target 89% of the time\". 10/11: Anion gap closed x2 overnight- Dextrose IVF infusing this AM. DKA resolved. Discussed plan of care with pharmacist re: successful insulin regimen that he can adhere to at discharge. Utilizing NPH BID, 0.2u/kg as a starting point given renal dysfunction which is improving. Anticipate he will need daily adjustment to NPH dosing over next few days. Diet has not been advanced yet- When diet advanced , will need bolus insulin coverage with meals, would cover 1:10 carb ratio, for 60gm carb consistent diet. [facility] informed CNS of left plantar foot wound (pic in media) - was getting OP foot wound care from Dr. Jenkins Severe with just gauze dressing - It will be imperative  that BG stay 140-180 while hospitalized to promote wound healing.     Patient clarified today he took himself off the pump due to inability to use his CGM- Asked him what he wanted to do re: pump therapy vs. Injection therapy for discharge- he verbalized \"I've got to get this under control\" and I've done Lantus/ and meal coverage/ correction scale to auscultation without rales or rhonchi  Extremities No foot wounds    Diabetic foot exam: deferred today        Laboratory  Recent Labs     10/10/23  1028 10/11/23  0504   WBC 21.8* 14.3*   HGB 11.4* 9.6*   HCT 38.3 27.4*   .3* 86.2    178       Recent Labs     10/10/23  2158 10/11/23  0103 10/11/23  0504    143 145   K 2.8* 3.5 4.1    106 111*   CO2 23 30 30   PHOS 0.8* 1.6* 3.4   BUN 57* 46* 44*   CREATININE 2.61* 2.21* 1.92*       Lab Results   Component Value Date    ALT 30 10/10/2023    AST 22 10/10/2023    ALKPHOS 124 (H) 10/10/2023    BILITOT 0.5 10/10/2023     Lab Results   Component Value Date    JWN6ZTF 10.57 (H) 10/10/2023     Lab Results   Component Value Date    LABA1C 11.7 (H) 10/11/2023       Factors impacting BG management  Factor Dose Comments   Nutrition:  Standard meals-NPO     60 grams/meal      Drugs:  Vasopressor load     Levophed      Affects insulin delivery     Infection Lactic acidosis 2/2 DKA    Other:   Kidney function  Liver function     ÁGNEL 2/2 volume depletion  WNL      Blood glucose pattern    Significant diabetes-related events over the past 24-72 hours  T1D, likely uncontrolled, severe DKA  Insulin infusion per DKA protocol   10/11: DKA resolved- Transitioned off insulin infusion with NPH 8 units BID/ and correction. Diet advanced to 45g CHO/ no tray at beside noted this morning.        Assessment and Nursing Intervention   Nursing Diagnosis Risk for unstable blood glucose pattern   Nursing Intervention Domain 5250 Decision-making Support   Nursing Interventions Examined current inpatient diabetes/blood glucose control   Explored factors facilitating and impeding inpatient management  Explored corrective strategies with patient and responsible inpatient provider   Informed patient of rational for insulin strategy while hospitalized     Nursing Diagnosis 96627 Ineffective Health Management   Nursing Intervention Domain 5250 Decision-making Support

## 2023-10-11 NOTE — H&P
History and Physical    Date of Service:  10/11/2023  Primary Care Provider: Phu Beasley MD  Source of information: The patient and Chart review    Chief Complaint: Altered Mental Status      History of Presenting Illness/Hospital course:   Eugenia Villalpando is a 52 y.o. male with T1DM, hypothyroidism, and detached R retina who was transferred from [facility] to [address] [place] [address] Floor ICU for DKA, hypothermia, hypotension, hyperkalemia 6.8, ÁNGEL with Cr 3.28, NSTEMI trop 360, TSH 10.57, and leucocytosis 21.8. CT head showed no acute process. CTAP showed fluid in mid and distal esophagus suggestive of reflux, distention of stomach and duodenum with dilated loops of small bowel and a 6mm right middle lobe pulmonary nodule. Pt was started on warming blanket, insulin gtt, IVF, antibiotics, hyperkalemia protocol, and transferred to [address] [place] [address] Floor ICU. Pt was found to have a L plantar diabetic ulcer. Renal, DTC, and WOCN were consulted. DKA resolved and insulin gtt was stopped this morning. Hospitalist service was consulted today for transfer of care. Pt was seen/examined and denied any complaints. REVIEW OF SYSTEMS:  A comprehensive review of systems was negative except for that written in the History of Present Illness. Past Medical History:   Diagnosis Date    Diabetes ([address])       History reviewed. No pertinent surgical history. Prior to Admission medications    Medication Sig Start Date End Date Taking?  Authorizing Provider   atorvastatin (LIPITOR) 10 MG tablet Take 10 mg by mouth daily 5/13/22   Automatic Reconciliation, Ar   fenofibrate (TRICOR) 145 MG tablet Take 145 mg by mouth daily    Automatic Reconciliation, Ar   insulin lispro (HUMALOG) 100 UNIT/ML SOLN injection vial ceived the following from [facility]: Outside name: HumaLOG U-100 Insulin 100 unit/mL injection 3/15/22   Automatic Reconciliation, Ar   levothyroxine (SYNTHROID) 150 MCG tablet ceived the following from [facility]: within normal limits   MAGNESIUM - Abnormal; Notable for the following components:    Magnesium 3.0 (*)     All other components within normal limits   CK - Abnormal; Notable for the following components:     Total  (*)     All other components within normal limits   TROPONIN - Abnormal; Notable for the following components:    Troponin, High Sensitivity 1,277 (*)     All other components within normal limits   TROPONIN - Abnormal; Notable for the following components:    Troponin, High Sensitivity 11,518 (*)     All other components within normal limits   T3, FREE - Abnormal; Notable for the following components:    T3, Free 1.3 (*)     All other components within normal limits   BASIC METABOLIC PANEL - Abnormal; Notable for the following components:    Sodium 125 (*)     Potassium 6.7 (*)     Chloride 85 (*)     CO2 5 (*)     Anion Gap 35 (*)     Glucose 1,275 (*)     BUN 65 (*)     Creatinine 3.18 (*)     Est, Glom Filt Rate 22 (*)     All other components within normal limits   BASIC METABOLIC PANEL - Abnormal; Notable for the following components:    Sodium 132 (*)     Potassium 3.4 (*)     Chloride 94 (*)     CO2 13 (*)     Anion Gap 25 (*)     Glucose 859 (*)     BUN 61 (*)     Creatinine 2.91 (*)     Bun/Cre Ratio 21 (*)     Est, Glom Filt Rate 25 (*)     All other components within normal limits   BASIC METABOLIC PANEL - Abnormal; Notable for the following components:    Potassium 2.8 (*)     Glucose 481 (*)     BUN 57 (*)     Creatinine 2.61 (*)     Bun/Cre Ratio 22 (*)     Est, Glom Filt Rate 28 (*)     All other components within normal limits   TROPONIN - Abnormal; Notable for the following components:    Troponin, High Sensitivity 12,354 (*)     All other components within normal limits   BASIC METABOLIC PANEL - Abnormal; Notable for the following components:    Glucose 284 (*)     BUN 46 (*)     Creatinine 2.21 (*)     Bun/Cre Ratio 21 (*)     Est, Glom Filt Rate 34 (*)     All other components

## 2023-10-11 NOTE — PLAN OF CARE
Problem: Discharge Planning  Goal: Discharge to home or other facility with appropriate resources  Outcome: Not Progressing     Problem: Safety - Adult  Goal: Free from fall injury  Outcome: Progressing     Problem: Chronic Conditions and Co-morbidities  Goal: Patient's chronic conditions and co-morbidity symptoms are monitored and maintained or improved  Outcome: Progressing     Problem: Skin/Tissue Integrity  Goal: Absence of new skin breakdown  Description: 1. Monitor for areas of redness and/or skin breakdown  2. Assess vascular access sites hourly  3. Every 4-6 hours minimum:  Change oxygen saturation probe site  4. Every 4-6 hours:  If on nasal continuous positive airway pressure, respiratory therapy assess nares and determine need for appliance change or resting period. Outcome: Progressing     Problem: ABCDS Injury Assessment  Goal: Absence of physical injury  Outcome: Progressing     Problem: Safety - Medical Restraint  Goal: Remains free of injury from restraints (Restraint for Interference with Medical Device)  Description: INTERVENTIONS:  1. Determine that other, less restrictive measures have been tried or would not be effective before applying the restraint  2. Evaluate the patient's condition at the time of restraint application  3. Inform patient/family regarding the reason for restraint  4.  Q2H: Monitor safety, psychosocial status, comfort, nutrition and hydration  Outcome: Completed     Problem: Discharge Planning  Goal: Discharge to home or other facility with appropriate resources  Outcome: Not Progressing

## 2023-10-11 NOTE — PROGRESS NOTES
Physician Progress Note      PATIENT:               Gilson Ruiz  CSN #:                  [id number]  :                       1970  ADMIT DATE:       10/10/2023 10:02 AM  DISCH DATE:  RESPONDING  PROVIDER #:        Maria Del Rosario Rosenberg MD        QUERY TEXT:    Stage of Chronic Kidney Disease: Please provide further specificity, if known. Clinical indicators include:  Options provided:  -- Chronic kidney disease stage 1  -- Chronic kidney disease stage 2  -- Chronic kidney disease stage 3  -- Chronic kidney disease stage 3a  -- Chronic kidney disease stage 3b  -- Chronic kidney disease stage 4  -- Chronic kidney disease stage 5  -- Chronic kidney disease stage 5, requiring dialysis  -- End stage renal disease  -- Other - I will add my own diagnosis  -- Disagree - Not applicable / Not valid  -- Disagree - Clinically Unable to determine / Unknown        PROVIDER RESPONSE TEXT:    Provider was unable to determine a response for this query.       Electronically signed by:  Maria Del Rosario Rosenberg MD 10/11/2023 5:03 PM

## 2023-10-11 NOTE — WOUND CARE
Wound Care Note:     New consult placed by NP request for left foot diabetic wound    Chart shows:  Admitted for septic shock  Past Medical History:   Diagnosis Date    Diabetes ([address])      WBC = 14.3 on 10/11/23  Admitted from [facility]    Assessment:   Patient is A&O x 4, communicative, continent with no assistance needed in repositioning. Bed: In Touch [place]  Diet: Adult diet regular; 3 carb choices  Patient reports no pain. Bilateral heels, buttocks, and sacral skin intact and without erythema. Faint palpable DP pulses bilaterally. 1. POA left plantar foot diabetic foot ulcer is a chronic wound that is being managed by Dr. Jose Ramos, patient states he has had it for 2 years, wound measures 2 cm x 1.9 cm x 0.8 cm, wound bed is pink, light granulation tissue, was open to air and no drainage noted, wound edges are open, sunny-wound intact without erythema. Last time he saw Dr. Jose Ramos was last week, he has been applying dry gauze. Opitcell AG, 4 x 4 and roll gauze applied. 2.  POA right anterior lower leg abrasion measures 8.5 cm x approximately 0.5 cm, wound is crusted, no drainage, no erythema. Left open to air. 3.  POA upper back with excoriation from patient scratching, linear lines that are crusted, no drainage, no erythema. Left open to air. Discussed with patient the need to get blood sugars under control, wound is close to bones and he is at high risk for osteomyelitis. Patient acknowledges understanding. Patient down for unknown time, no signs of pressure injury at this time. Spoke with Dr. Clifton Lozano, wound care orders obtained. Patient repositioned supine. Heels offloaded on pillows. Recommendations:    Left foot- Every other day cleanse with VASHE, wipe wound bed clean and dry, loosely fill with Opticell AG, cover with 4 x 4 and secure with roll gauze and tape.     Skin Care & Pressure

## 2023-10-11 NOTE — PROGRESS NOTES
[facility]   [address], [address] [facility], [address] [facility]  Phone: [phone]   [phone]    [email or web address]     Nephrology Progress Note    Patient Name : Quan Tucker      : 1970     MRN : [id number]  Date of Admission : 10/10/2023  Date of Servive : 10/11/23    CC: Follow up for ÁNGEL       Assessment and Plan   ÁNGEL:  - 2/2 profound volume depletion in the setting of DKA  - no issues with obstruction on CT  - cont with insulin, IVF per CCM team  - no urgent need for RRT  - serial labs     Hyperkalemia:  - 2/2 DKA, on insulin drip  - resolved     DKA:  - on insulin drip and IVF  - serial labs  - per CCM team     Acidosis  Acidemia     Leukocytosis:  - on broad spectrum abx  - sepsis w/up per CCM team     Hyperphos:  - resolved     IDDM  RML pulmonary nodule     Interval History:  Seen and examined. Cr, K improving. Good UOP. On insulin drip and D5 1/2NS. Resting, off pressors, no cp or sob. Review of Systems: Pertinent items are noted in HPI.     Current Medications:   Current Facility-Administered Medications   Medication Dose Route Frequency    insulin lispro (HUMALOG) injection vial 0-8 Units  0-8 Units SubCUTAneous TID WC    insulin lispro (HUMALOG) injection vial 0-4 Units  0-4 Units SubCUTAneous Nightly    insulin NPH (HumuLIN N;NovoLIN N) injection vial 8 Units  8 Units SubCUTAneous BID AC    sodium chloride flush 0.9 % injection 5-40 mL  5-40 mL IntraVENous 2 times per day    sodium chloride flush 0.9 % injection 5-40 mL  5-40 mL IntraVENous PRN    0.9 % sodium chloride infusion   IntraVENous PRN    enoxaparin (LOVENOX) injection 40 mg  40 mg SubCUTAneous Daily    ondansetron (ZOFRAN-ODT) disintegrating tablet 4 mg  4 mg Oral Q8H PRN    Or    ondansetron (ZOFRAN) injection 4 mg  4 mg IntraVENous Q6H PRN    acetaminophen (TYLENOL) tablet 650 mg  650 mg Oral Q6H PRN    Or    acetaminophen (TYLENOL) suppository 650 mg  650 mg Rectal Q6H PRN

## 2023-10-11 NOTE — PROCEDURES
Procedure Note - Central Venous Access:   Performed by VIVEK Sofia NP     Diagnosis: DKA, hypokalemic, hypophosphemic  Insertion Date: 10/11/23  Time:12:20 AM   Obtained Consent? yes; emergent   Procedure Location:  ICU. Immediately prior to the procedure, the patient was reevaluated and found suitable for the planned procedure and any planned medications. Immediately prior to the procedure a time out was called to verify the correct patient, procedure, equipment, staff, and marking as appropriate. Central line Bundle:  Full sterile barrier precautions used. 7-Step Sterility Protocol followed. (cap, mask sterile gown, sterile gloves, large sterile sheet, hand hygiene, 2% chlorhexidine for cutaneous antisepsis)  5 mL 1% Lidocaine placed at insertion site. Patient positioned in Trendelenburg?yes   The site was prepped with ChloraPrep. Catheter inserted into a new site. Using Seldinger technique a Triple Lumen CVC was placed in the Right, Internal Jugular Vein via direct cannulation with 1 number of attempts for IV Access. Ultrasound Guidance was utilized. There was good dark, non-pulsatile blood return in all ports. Femoral Site? no. If Yes, reason femoral site was chosen: n/a  Catheter secured. Biopatch/CHG bio-occlusive dressing in place? yes. The following complications were encountered: None. A follow-up chest x-ray ordered post procedure. The procedure was tolerated well.     VIVEK Sofia NP  Critical Care Medicine  [facility]

## 2023-10-11 NOTE — CARE COORDINATION
Care Management Initial Assessment       RUR:Calculating   Readmission? No     10/11/23 0917   Service Assessment   Patient Orientation Alert and Oriented;Person;Place;Situation;Self   Cognition Alert   History Provided By Patient   Primary Caregiver Self   Support Systems Spouse/Significant Other  (Wife Danny Duff: [phone])   [address] is: Legal Next of Kin  (Wife)   PCP Verified by CM Yes  (Dr Mateo Closs)   Last Visit to PCP Within last two years   Prior Functional Level Independent in ADLs/IADLs   Current Functional Level Assistance with the following:;Bathing;Dressing; Toileting;Mobility   Can patient return to prior living arrangement Yes   Ability to make needs known: Fair   Family able to assist with home care needs: Yes   Would you like for me to discuss the discharge plan with any other family members/significant others, and if so, who? Yes  (Wife)   Financial Resources None   Community Resources None   Social/Functional History   Lives With Spouse   Type of [address] [facility]  Two level   Home Access Stairs to enter with rails   Entrance Stairs - Number of Steps 2   Entrance Stairs - [address] Responsibilities Yes   Ambulation Assistance Independent   Transfer Assistance Independent   Active  Yes   Mode of Transportation Car   Occupation Full time employment   Type of Occupation [employer]   Discharge Planning   Living Arrangements Spouse/Significant Other   Current Services Prior To Admission None   Potential Assistance Purchasing Medications No   Patient expects to be discharged to: House   One/Two Story Residence Two story   # of Interior Steps 20   Lift Chair Available No   History of falls? 1     Patient admitted for DKA on an insulin gtt. Care manager met with patient to introduce self and explain role.  Patient lives

## 2023-10-12 ENCOUNTER — APPOINTMENT (OUTPATIENT)
Facility: HOSPITAL | Age: 53
End: 2023-10-12
Payer: COMMERCIAL

## 2023-10-12 LAB
ANION GAP SERPL CALC-SCNC: 6 MMOL/L (ref 5–15)
BUN SERPL-MCNC: 24 MG/DL (ref 6–20)
BUN/CREAT SERPL: 20 (ref 12–20)
CALCIUM SERPL-MCNC: 8.6 MG/DL (ref 8.5–10.1)
CHLORIDE SERPL-SCNC: 107 MMOL/L (ref 97–108)
CO2 SERPL-SCNC: 29 MMOL/L (ref 21–32)
CREAT SERPL-MCNC: 1.23 MG/DL (ref 0.7–1.3)
ECHO BSA: 2.08 M2
GLUCOSE BLD STRIP.AUTO-MCNC: 163 MG/DL (ref 65–117)
GLUCOSE BLD STRIP.AUTO-MCNC: 187 MG/DL (ref 65–117)
GLUCOSE BLD STRIP.AUTO-MCNC: 270 MG/DL (ref 65–117)
GLUCOSE BLD STRIP.AUTO-MCNC: 332 MG/DL (ref 65–117)
GLUCOSE SERPL-MCNC: 308 MG/DL (ref 65–100)
MAGNESIUM SERPL-MCNC: 2.4 MG/DL (ref 1.6–2.4)
PHOSPHATE SERPL-MCNC: 2.7 MG/DL (ref 2.6–4.7)
POTASSIUM SERPL-SCNC: 4.3 MMOL/L (ref 3.5–5.1)
SERVICE CMNT-IMP: ABNORMAL
SODIUM SERPL-SCNC: 142 MMOL/L (ref 136–145)

## 2023-10-12 PROCEDURE — 97161 PT EVAL LOW COMPLEX 20 MIN: CPT

## 2023-10-12 PROCEDURE — 97535 SELF CARE MNGMENT TRAINING: CPT

## 2023-10-12 PROCEDURE — 2580000003 HC RX 258: Performed by: NURSE PRACTITIONER

## 2023-10-12 PROCEDURE — 6370000000 HC RX 637 (ALT 250 FOR IP): Performed by: CLINICAL NURSE SPECIALIST

## 2023-10-12 PROCEDURE — 6370000000 HC RX 637 (ALT 250 FOR IP): Performed by: PHYSICIAN ASSISTANT

## 2023-10-12 PROCEDURE — 6370000000 HC RX 637 (ALT 250 FOR IP): Performed by: INTERNAL MEDICINE

## 2023-10-12 PROCEDURE — 97165 OT EVAL LOW COMPLEX 30 MIN: CPT

## 2023-10-12 PROCEDURE — 93306 TTE W/DOPPLER COMPLETE: CPT

## 2023-10-12 PROCEDURE — 83735 ASSAY OF MAGNESIUM: CPT

## 2023-10-12 PROCEDURE — 80048 BASIC METABOLIC PNL TOTAL CA: CPT

## 2023-10-12 PROCEDURE — 6360000002 HC RX W HCPCS: Performed by: NURSE PRACTITIONER

## 2023-10-12 PROCEDURE — 84100 ASSAY OF PHOSPHORUS: CPT

## 2023-10-12 PROCEDURE — 93306 TTE W/DOPPLER COMPLETE: CPT | Performed by: INTERNAL MEDICINE

## 2023-10-12 PROCEDURE — 99233 SBSQ HOSP IP/OBS HIGH 50: CPT | Performed by: CLINICAL NURSE SPECIALIST

## 2023-10-12 PROCEDURE — 92526 ORAL FUNCTION THERAPY: CPT

## 2023-10-12 PROCEDURE — 97116 GAIT TRAINING THERAPY: CPT

## 2023-10-12 PROCEDURE — 99418 PROLNG IP/OBS E/M EA 15 MIN: CPT | Performed by: CLINICAL NURSE SPECIALIST

## 2023-10-12 PROCEDURE — 74018 RADEX ABDOMEN 1 VIEW: CPT

## 2023-10-12 PROCEDURE — 1100000000 HC RM PRIVATE

## 2023-10-12 PROCEDURE — 36415 COLL VENOUS BLD VENIPUNCTURE: CPT

## 2023-10-12 PROCEDURE — 82962 GLUCOSE BLOOD TEST: CPT

## 2023-10-12 RX ORDER — LEVOTHYROXINE SODIUM 0.07 MG/1
150 TABLET ORAL
Status: DISCONTINUED | OUTPATIENT
Start: 2023-10-12 | End: 2023-10-13 | Stop reason: HOSPADM

## 2023-10-12 RX ORDER — INSULIN GLARGINE 100 [IU]/ML
25 INJECTION, SOLUTION SUBCUTANEOUS NIGHTLY
Status: DISCONTINUED | OUTPATIENT
Start: 2023-10-12 | End: 2023-10-13 | Stop reason: HOSPADM

## 2023-10-12 RX ORDER — INSULIN LISPRO 100 [IU]/ML
5 INJECTION, SOLUTION INTRAVENOUS; SUBCUTANEOUS
Status: DISCONTINUED | OUTPATIENT
Start: 2023-10-12 | End: 2023-10-13 | Stop reason: HOSPADM

## 2023-10-12 RX ADMIN — SODIUM CHLORIDE, PRESERVATIVE FREE 10 ML: 5 INJECTION INTRAVENOUS at 21:48

## 2023-10-12 RX ADMIN — SENNOSIDES AND DOCUSATE SODIUM 1 TABLET: 50; 8.6 TABLET ORAL at 21:48

## 2023-10-12 RX ADMIN — Medication 4 UNITS: at 12:02

## 2023-10-12 RX ADMIN — Medication 6 UNITS: at 07:28

## 2023-10-12 RX ADMIN — Medication 5 UNITS: at 12:03

## 2023-10-12 RX ADMIN — LEVOTHYROXINE SODIUM 150 MCG: 0.07 TABLET ORAL at 10:49

## 2023-10-12 RX ADMIN — Medication 5 UNITS: at 08:44

## 2023-10-12 RX ADMIN — Medication 8 UNITS: at 07:28

## 2023-10-12 RX ADMIN — ENOXAPARIN SODIUM 40 MG: 100 INJECTION SUBCUTANEOUS at 08:45

## 2023-10-12 RX ADMIN — SENNOSIDES AND DOCUSATE SODIUM 1 TABLET: 50; 8.6 TABLET ORAL at 08:45

## 2023-10-12 RX ADMIN — Medication 5 UNITS: at 16:58

## 2023-10-12 RX ADMIN — PANTOPRAZOLE SODIUM 40 MG: 40 TABLET, DELAYED RELEASE ORAL at 07:34

## 2023-10-12 RX ADMIN — SODIUM CHLORIDE, PRESERVATIVE FREE 10 ML: 5 INJECTION INTRAVENOUS at 08:45

## 2023-10-12 ASSESSMENT — PAIN SCALES - GENERAL
PAINLEVEL_OUTOF10: 0
PAINLEVEL_OUTOF10: 0

## 2023-10-12 NOTE — PROGRESS NOTES
[address] [facility] Adult  Hospitalist Group                                                                                          Hospitalist Progress Note  Suzan Junior, [place]  Answering service: [phone] from in house phone        Date of Service:  10/12/2023  NAME:  Chu Nicolas  :  1970  MRN:  [id number]      Admission Summary:   Chu Nicolas is a 52 y.o. male with T1DM, hypothyroidism, and detached R retina who was transferred from [facility] to [facility] ICU for DKA, hypothermia, hypotension, hyperkalemia 6.8, ÁNGEL with Cr 3.28, NSTEMI trop 360, TSH 10.57, and leucocytosis 21.8. CT head showed no acute process. CTAP showed fluid in mid and distal esophagus suggestive of reflux, distention of stomach and duodenum with dilated loops of small bowel and a 6mm right middle lobe pulmonary nodule. Pt was started on warming blanket, insulin gtt, IVF, antibiotics, hyperkalemia protocol, and transferred to [facility] ICU. Pt was found to have a L plantar diabetic ulcer. Renal, DTC, and WOCN were consulted. DKA resolved and insulin gtt was stopped this morning. Hospitalist service was consulted for transfer of care. Interval history / Subjective:   Saw the patient on rounds, he states he feels well this morning.  He denies any n/v, abdominal bloating or abdominal pain     Assessment & Plan:     DKA  T1DM, uncontrolled with medication nonadherence  - A1c 11.7%  - DKA resolved s/p insulin gtt, IVF  - DTC consulted and recommended for discharge: NPH 8units BID, low correction scale insulin, 60g carb consistent diet, lispro 5units TIDWM; appt made with Endo Dr Maged Vanegas 2024  - Pt asking for long acting insulin rather than NPH, will reach out to DM management for recs     ÁNGEL (POA)  - likely due to volume depletion  - Renal consulted  - Cr improving with IVF     Hyperkalemia  - resolved s/p hyperkalemia protocol     Hypothermia (POA)  - resolved s/p warming blanket  - PT/OT evals as pt was found injection 4 mg  4 mg IntraVENous Q6H PRN    acetaminophen (TYLENOL) tablet 650 mg  650 mg Oral Q6H PRN    Or    acetaminophen (TYLENOL) suppository 650 mg  650 mg Rectal Q6H PRN    glucose chewable tablet 16 g  4 tablet Oral PRN    dextrose bolus 10% 125 mL  125 mL IntraVENous PRN    Or    dextrose bolus 10% 250 mL  250 mL IntraVENous PRN    glucagon injection 1 mg  1 mg SubCUTAneous PRN    dextrose 10 % infusion   IntraVENous Continuous PRN    dextrose 5 % and 0.45 % sodium chloride infusion   IntraVENous Continuous PRN     ______________________________________________________________________  EXPECTED LENGTH OF STAY: 3  ACTUAL LENGTH OF STAY:          2                 Poonam Or MilligramMORAIMA

## 2023-10-12 NOTE — PROGRESS NOTES
Physician Progress Note      PATIENT:               Rayna Way  CSN #:                  [id number]  :                       1970  ADMIT DATE:       10/10/2023 10:02 AM  DISCH DATE:  RESPONDING  PROVIDER #:        Fredie Prier Milligram PA-C          QUERY TEXT:    Patient admitted with DKA and noted to have elevated WBC, elevated lactic   acid, elevated procalcitonin, and low blood pressure. If possible, please   document in progress notes and discharge summary if you are evaluating and/or   treating any of the following: The medical record reflects the following:  Risk Factors: 52yo with DKA and ÁNGEL    Clinical Indicators:  WBC: 21.8 -- 14.3  Lactic Acid: 7.9 -- 5.6 -- 2.5 -- 2.4 -- 2.2 -- 1.6  C-Reactive Protein: n/a  Procalcitonin: 4.49 -- 5.68 -- 5.26  Blood cultures: no growth to date    VS first 24hrs  Temp: 87.3-99.1  HR: 70-96  RR: 13-27  BP: 61/53 (MAP 58) - 148/71 (MAP 94)    10/12 Nephrology  Leukocytosis:  - on broad spectrum abx  - sepsis w/up negative    Treatment: IV fluids, LR bolus, Vancomycin, blood cultures    Thank you,  Sylvia Faust  [phone]  I am also available via [employer]. Options provided:  -- Sepsis, present on admission, due to, Please document source. -- SIRS of non-infectious origin due to DKA with associated acute organ   dysfunction, sepsis ruled out  -- Other - I will add my own diagnosis  -- Disagree - Not applicable / Not valid  -- Disagree - Clinically unable to determine / Unknown  -- Refer to Clinical Documentation Reviewer    PROVIDER RESPONSE TEXT:    This patient has SIRS of non-infectious origin due to DKA with associated   acute organ dysfunction, sepsis ruled out.     Query created by: Juliette Rodriges on 10/12/2023 3:13 PM      Electronically signed by:  James Hairston PA-C 10/12/2023 4:04 PM

## 2023-10-12 NOTE — DIABETES MGMT
[address] [facility]  DIABETES MANAGEMENT CONSULT    Consulted by Provider for advanced nursing evaluation and care for inpatient blood glucose management. Evaluation and Action Plan   Danny Mendez is a 52 y.o. male living with T1D for 50yrs (diagnosed at age 9). Admitted in severe DKA with AMS/ requiring ICU care due to severe acidosis. Insulin infusing per DKA protocol. IVF ongoing. Current 11.4%    Suprisingly patietn was able to verbalize his diabetes mgmt self care practices: had allergic reaction to CGM (dexcom G6) adhesive and had stopped using it - had taken self off his insulin pump (Tandem) 2 yrs ago for unknown reason I could not understand. Was utilizing novolog insulin at 40 units daily via correctional scale -Had stopped seeing endocrinologist around 2 yrs ago because \"he was getting on me for not being in target range when I was in target 89% of the time\". 10/11: Anion gap closed x2 overnight- Dextrose IVF infusing this AM. DKA resolved. Discussed plan of care with pharmacist re: successful insulin regimen that he can adhere to at discharge. Utilizing NPH BID, 0.2u/kg as a starting point given renal dysfunction which is improving. Anticipate he will need daily adjustment to NPH dosing over next few days. Diet has not been advanced yet- When diet advanced , will need bolus insulin coverage with meals, would cover 1:10 carb ratio, for 60gm carb consistent diet. [facility] informed CNS of left plantar foot wound (pic in media) - was getting OP foot wound care from Dr. Lord Raya with just gauze dressing - It will be imperative  that BG stay 140-180 while hospitalized to promote wound healing.     Patient clarified today he took himself off the pump due to inability to use his CGM- Asked him what he wanted to do re: pump therapy vs. Injection therapy for discharge- he verbalized \"I've got to get this under control\" and I've done Lantus/ and meal coverage/ correction scale to 45g CHO/ no tray at beside noted this morning. 10/12: Adjusted basal insulin to once daily injection/ added meal coverage today    Assessment and Nursing Intervention   Nursing Diagnosis Risk for unstable blood glucose pattern   Nursing Intervention Domain 5250 Decision-making Support   Nursing Interventions Examined current inpatient diabetes/blood glucose control   Explored factors facilitating and impeding inpatient management  Explored corrective strategies with patient and responsible inpatient provider   Informed patient of rational for insulin strategy while hospitalized     Nursing Diagnosis 89133 Ineffective Health Management   Nursing Intervention Domain 5250 Decision-making Support   Nursing Interventions Identified diabetes self-management practices impeding diabetes control  Discussed diabetes survival skills related to  Healthy Plate eating plan; given handouts  Role of physical activity in improving insulin sensitivity and action  Procedure for blood glucose monitoring & options for low-cost products  Medications plan at discharge     Billing Code(s)   71572 x1  09576    Before making these care recommendations, I personally reviewed the hospitalization record, including notes, laboratory & diagnostic data and current medications, and examined the patient at the bedside (circumstances permitting) before determining care. More than fifty (50) percent of the time was spent in patient counseling and/or care coordination.   Total minutes: [address] [facility] 75, APRN - CNS  Diabetes Clinical Nurse Specialist  Program for Diabetes Health  Access via [employer]

## 2023-10-12 NOTE — PROGRESS NOTES
[facility]   [address], [address] [facility], [address] [facility]  Phone: [phone]   [phone]    [email or web address]     Nephrology Progress Note    Patient Name : Fernando Boyd      : 1970     MRN : [id number]  Date of Admission : 10/10/2023  Date of Servive : 10/12/23    CC: Follow up for ÁNGEL       Assessment and Plan   ÁNGEL:  - 2/2 profound volume depletion in the setting of DKA  - no issues with obstruction on CT  - resolved  - cont present care for now     Hyperkalemia:  - 2/2 DKA  - resolved     DKA:  - off insulin drip  - management per primary team     Acidosis  Acidemia  - resolved    Leukocytosis:  - on broad spectrum abx  - sepsis w/up negative     Hyperphos:  - resolved     IDDM  RML pulmonary nodule      Will sign off case. Call us back with any issues     Interval History:  Seen and examined. Cr, K improving. Good UOP. Off insulin, out of the ICU . no cp or sob. Review of Systems: Pertinent items are noted in HPI.     Current Medications:   Current Facility-Administered Medications   Medication Dose Route Frequency    insulin lispro (HUMALOG) injection vial 5 Units  5 Units SubCUTAneous TID WC    insulin NPH (HumuLIN N;NovoLIN N) injection vial 13 Units  13 Units SubCUTAneous BID AC    insulin lispro (HUMALOG) injection vial 0-8 Units  0-8 Units SubCUTAneous TID WC    insulin lispro (HUMALOG) injection vial 0-4 Units  0-4 Units SubCUTAneous Nightly    Benzocaine-Menthol (CEPACOL) 1 lozenge  1 lozenge Oral Q2H PRN    pantoprazole (PROTONIX) tablet 40 mg  40 mg Oral QAM AC    sennosides-docusate sodium (SENOKOT-S) 8.6-50 MG tablet 1 tablet  1 tablet Oral BID    sodium chloride flush 0.9 % injection 5-40 mL  5-40 mL IntraVENous 2 times per day    sodium chloride flush 0.9 % injection 5-40 mL  5-40 mL IntraVENous PRN    0.9 % sodium chloride infusion   IntraVENous PRN    enoxaparin (LOVENOX) injection 40 mg  40 mg SubCUTAneous Daily    ondansetron

## 2023-10-12 NOTE — PLAN OF CARE
Problem: Physical Therapy - Adult  Goal: By Discharge: Performs mobility at highest level of function for planned discharge setting. See evaluation for individualized goals. Description: FUNCTIONAL STATUS PRIOR TO ADMISSION: Patient was modified independent using a single point cane for functional mobility due to chronic wound on plantar surface L foot. Works from home. Lives with wife who works as clinical pharmacist at [address]  Initiated 10/12/2023  1. Patient will move from supine to sit and sit to supine in bed with independence within 7 day(s). 2.  Patient will perform sit to stand with modified independence within 7 day(s). 3.  Patient will transfer from bed to chair and chair to bed with modified independence using the least restrictive device within 7 day(s). 4.  Patient will ambulate with modified independence for 300 feet with the least restrictive device within 7 day(s). 5.  Patient will ascend/descend 13 stairs with single handrail(s) with supervision/set-up within 7 day(s). Outcome: Progressing   PHYSICAL THERAPY EVALUATION    Patient: Caron Sainz (61 y.o. male)  Date: 10/12/2023  Primary Diagnosis: Hyperkalemia [E87.5]  Abnormal ECG [R94.31]  Elevated TSH [R79.89]  ÁNGEL (acute kidney injury) ([address]) [N17.9]  Hypothermia, initial encounter [T68. XXXA]  Septic shock ([address]) [A41.9, R65.21]  Hypotension, unspecified hypotension type [I95.9]  Altered mental status, unspecified altered mental status type [R41.82]  Open wound of foot excluding toes [S91.309A]  Type 1 diabetes mellitus with ketoacidotic coma ([address]) [E10.11]       Precautions: Fall Risk                    ASSESSMENT :   DEFICITS/IMPAIRMENTS:   The patient is limited by decreased functional mobility, activity tolerance, balance following admit for septic shock requiring ICU level of care. Pt A+O x 4 this date. Tolerated gait training on unit with RW and CGA.  Completed functional dynamic standing activities

## 2023-10-12 NOTE — PROGRESS NOTES
Physician Progress Note      PATIENT:               Nancy Kerr  CSN #:                  [id number]  :                       1970  ADMIT DATE:       10/10/2023 10:02 AM  DISCH DATE:  Denys Wright  PROVIDER #:        Janit Georgi Milligram PA-C          QUERY TEXT:    Patient admitted with DKA. Noted documentation of NSTEMI in both H&P notes and   10/12 Hospitalist PN. In order to support the diagnosis of NSTEMI, please   include additional clinical indicators in your documentation. Or please   document if the diagnosis of NSTEMI has been ruled out after further study. The medical record reflects the following:  Risk Factors: 52yo with DKA    Clinical Indicators:  Troponin: 360 -- 1277 -- 5504 -- 41499 -- 98672 -- 19091    EKG: Sinus Rhythm    Notes  transferred from [facility] to [facility] ICU for DKA, hypothermia, hypotension,   hyperkalemia 6.8, ÁNGEL with Cr 3.28, NSTEMI trop 360, TSH 10.57, and   leucocytosis 21.8. Treatment: trending troponin labs, EKG, telemetry    Thank you,  Wannetta Gilford  [phone]  I am also available via [employer]. Options provided:  -- NSTEMI present as evidenced by, Please document evidence. -- NSTEMI was ruled out  -- Other - I will add my own diagnosis  -- Disagree - Not applicable / Not valid  -- Disagree - Clinically unable to determine / Unknown  -- Refer to Clinical Documentation Reviewer    PROVIDER RESPONSE TEXT:    NSTEMI was ruled out after study.     Query created by: Wannetta Gilford on 10/12/2023 3:14 PM      Electronically signed by:  Carter George PA-C 10/12/2023 4:08 PM

## 2023-10-13 VITALS
HEIGHT: 71 IN | OXYGEN SATURATION: 100 % | HEART RATE: 79 BPM | SYSTOLIC BLOOD PRESSURE: 128 MMHG | TEMPERATURE: 98.6 F | RESPIRATION RATE: 18 BRPM | BODY MASS INDEX: 26.6 KG/M2 | DIASTOLIC BLOOD PRESSURE: 64 MMHG | WEIGHT: 190 LBS

## 2023-10-13 LAB
ANION GAP SERPL CALC-SCNC: 2 MMOL/L (ref 5–15)
BASOPHILS # BLD: 0 K/UL (ref 0–0.1)
BASOPHILS NFR BLD: 0 % (ref 0–1)
BUN SERPL-MCNC: 18 MG/DL (ref 6–20)
BUN/CREAT SERPL: 19 (ref 12–20)
CALCIUM SERPL-MCNC: 8.5 MG/DL (ref 8.5–10.1)
CHLORIDE SERPL-SCNC: 104 MMOL/L (ref 97–108)
CO2 SERPL-SCNC: 33 MMOL/L (ref 21–32)
CREAT SERPL-MCNC: 0.93 MG/DL (ref 0.7–1.3)
DIFFERENTIAL METHOD BLD: ABNORMAL
EOSINOPHIL # BLD: 0 K/UL (ref 0–0.4)
EOSINOPHIL NFR BLD: 0 % (ref 0–7)
ERYTHROCYTE [DISTWIDTH] IN BLOOD BY AUTOMATED COUNT: 12.9 % (ref 11.5–14.5)
GLUCOSE BLD STRIP.AUTO-MCNC: 139 MG/DL (ref 65–117)
GLUCOSE BLD STRIP.AUTO-MCNC: 187 MG/DL (ref 65–117)
GLUCOSE SERPL-MCNC: 191 MG/DL (ref 65–100)
HCT VFR BLD AUTO: 30.1 % (ref 36.6–50.3)
HGB BLD-MCNC: 9.9 G/DL (ref 12.1–17)
IMM GRANULOCYTES # BLD AUTO: 0 K/UL (ref 0–0.04)
IMM GRANULOCYTES NFR BLD AUTO: 0 % (ref 0–0.5)
LYMPHOCYTES # BLD: 1.6 K/UL (ref 0.8–3.5)
LYMPHOCYTES NFR BLD: 23 % (ref 12–49)
MAGNESIUM SERPL-MCNC: 2.4 MG/DL (ref 1.6–2.4)
MCH RBC QN AUTO: 29.6 PG (ref 26–34)
MCHC RBC AUTO-ENTMCNC: 32.9 G/DL (ref 30–36.5)
MCV RBC AUTO: 90.1 FL (ref 80–99)
MONOCYTES # BLD: 0.4 K/UL (ref 0–1)
MONOCYTES NFR BLD: 6 % (ref 5–13)
NEUTS SEG # BLD: 4.7 K/UL (ref 1.8–8)
NEUTS SEG NFR BLD: 70 % (ref 32–75)
NRBC # BLD: 0 K/UL (ref 0–0.01)
NRBC BLD-RTO: 0 PER 100 WBC
PHOSPHATE SERPL-MCNC: 1.4 MG/DL (ref 2.6–4.7)
PLATELET # BLD AUTO: 128 K/UL (ref 150–400)
PMV BLD AUTO: 10.1 FL (ref 8.9–12.9)
POTASSIUM SERPL-SCNC: 3.6 MMOL/L (ref 3.5–5.1)
RBC # BLD AUTO: 3.34 M/UL (ref 4.1–5.7)
SERVICE CMNT-IMP: ABNORMAL
SERVICE CMNT-IMP: ABNORMAL
SODIUM SERPL-SCNC: 139 MMOL/L (ref 136–145)
WBC # BLD AUTO: 6.7 K/UL (ref 4.1–11.1)

## 2023-10-13 PROCEDURE — 2580000003 HC RX 258: Performed by: NURSE PRACTITIONER

## 2023-10-13 PROCEDURE — 84100 ASSAY OF PHOSPHORUS: CPT

## 2023-10-13 PROCEDURE — 6370000000 HC RX 637 (ALT 250 FOR IP): Performed by: CLINICAL NURSE SPECIALIST

## 2023-10-13 PROCEDURE — 6360000002 HC RX W HCPCS: Performed by: NURSE PRACTITIONER

## 2023-10-13 PROCEDURE — 85025 COMPLETE CBC W/AUTO DIFF WBC: CPT

## 2023-10-13 PROCEDURE — 6370000000 HC RX 637 (ALT 250 FOR IP): Performed by: INTERNAL MEDICINE

## 2023-10-13 PROCEDURE — 6370000000 HC RX 637 (ALT 250 FOR IP): Performed by: PHYSICIAN ASSISTANT

## 2023-10-13 PROCEDURE — 83735 ASSAY OF MAGNESIUM: CPT

## 2023-10-13 PROCEDURE — 36415 COLL VENOUS BLD VENIPUNCTURE: CPT

## 2023-10-13 PROCEDURE — 80048 BASIC METABOLIC PNL TOTAL CA: CPT

## 2023-10-13 PROCEDURE — 82962 GLUCOSE BLOOD TEST: CPT

## 2023-10-13 RX ORDER — LEVOTHYROXINE SODIUM 0.15 MG/1
150 TABLET ORAL
Qty: 30 TABLET | Refills: 0 | Status: SHIPPED | OUTPATIENT
Start: 2023-10-14

## 2023-10-13 RX ORDER — INSULIN LISPRO 100 [IU]/ML
0-10 INJECTION, SOLUTION INTRAVENOUS; SUBCUTANEOUS
Qty: 14 ADJUSTABLE DOSE PRE-FILLED PEN SYRINGE | Refills: 0 | Status: SHIPPED | OUTPATIENT
Start: 2023-10-13

## 2023-10-13 RX ORDER — ACYCLOVIR 400 MG/1
TABLET ORAL
Qty: 14 EACH | Refills: 0 | Status: SHIPPED | OUTPATIENT
Start: 2023-10-13

## 2023-10-13 RX ADMIN — Medication 5 UNITS: at 08:25

## 2023-10-13 RX ADMIN — SODIUM CHLORIDE, PRESERVATIVE FREE 10 ML: 5 INJECTION INTRAVENOUS at 10:02

## 2023-10-13 RX ADMIN — PANTOPRAZOLE SODIUM 40 MG: 40 TABLET, DELAYED RELEASE ORAL at 06:39

## 2023-10-13 RX ADMIN — LEVOTHYROXINE SODIUM 150 MCG: 0.07 TABLET ORAL at 06:39

## 2023-10-13 RX ADMIN — ENOXAPARIN SODIUM 40 MG: 100 INJECTION SUBCUTANEOUS at 10:02

## 2023-10-13 NOTE — PROGRESS NOTES
I have reviewed the discharge instructions with the patient. The patient verbalized understanding. All personal belongings were gathered and IV access was discontinued. Patient said that he would resume his wound care upon arrival at home.

## 2023-10-13 NOTE — DISCHARGE SUMMARY
Discharge Summary       PATIENT ID: Shania Ny  MRN: [id number]   YOB: 1970    DATE OF ADMISSION: 10/10/2023 10:02 AM    DATE OF DISCHARGE: 10/13/2023   PRIMARY CARE PROVIDER: Prema Mccullough MD     ATTENDING PHYSICIAN: Jamie Rodríguez MD  DISCHARGING PROVIDER: Jatinder Ramirez PA-C    To contact this individual call [phone] and ask the  to page. If unavailable ask to be transferred the Adult Hospitalist Department. CONSULTATIONS: IP WOUND CARE NURSE CONSULT TO EVAL  IP CONSULT TO DIABETES MANAGEMENT  IP CONSULT TO DIABETES MANAGEMENT    PROCEDURES/SURGERIES: * No surgery found *     ADMITTING [facility] [address] COURSE:   Shania Ny is a 52 y.o. male with T1DM, hypothyroidism, and detached R retina who was transferred from [facility] to [facility] ICU for DKA, hypothermia, hypotension, hyperkalemia 6.8, ÁNGEL with Cr 3.28, NSTEMI trop 360, TSH 10.57, and leucocytosis 21.8. CT head showed no acute process. CTAP showed fluid in mid and distal esophagus suggestive of reflux, distention of stomach and duodenum with dilated loops of small bowel and a 6mm right middle lobe pulmonary nodule. Pt was started on warming blanket, insulin gtt, IVF, antibiotics, hyperkalemia protocol, and transferred to [facility] ICU. Pt was found to have a L plantar diabetic ulcer. Renal, DTC, and WOCN were consulted. DKA resolved and insulin gtt was stopped this morning. Hospitalist service was consulted for transfer of care.      DISCHARGE DIAGNOSES / PLAN:      DKA  T1DM, uncontrolled with medication nonadherence  - A1c 11.7%  - DKA resolved s/p insulin gtt, IVF  - DTC consulted and recommended for discharge: Lantus 25u Daily, lispro correction regimen  - Follow up with endocrinologist Dr. Olivia Meza     Hypothyroidism  - Continue levothyroxine, follow up with endocrinologist    ÁNGEL (POA): Resolved  - likely due to volume depletion  - Renal consulted  - Cr normalized     Hyperkalemia  - resolved s/p

## 2023-10-13 NOTE — DISCHARGE INSTRUCTIONS
Discharge Instructions       PATIENT ID: Caron Sainz  MRN: [id number]   YOB: 1970    DATE OF ADMISSION: 10/10/2023   DATE OF DISCHARGE: 10/13/2023    PRIMARY CARE PROVIDER: Marisol Barrientos     ATTENDING PHYSICIAN: Domenica Askew MD   DISCHARGING PROVIDER: Viji Palomo PA-C    To contact this individual call [phone] and ask the  to page. If unavailable ask to be transferred the Adult Hospitalist Department. DISCHARGE DIAGNOSES   Caron Sainz is a 52 y.o. male with T1DM, hypothyroidism, and detached R retina who was transferred from [facility] to [facility] ICU for DKA, hypothermia, hypotension, hyperkalemia 6.8, ÁNGEL with Cr 3.28, NSTEMI trop 360, TSH 10.57, and leucocytosis 21.8. CT head showed no acute process. CTAP showed fluid in mid and distal esophagus suggestive of reflux, distention of stomach and duodenum with dilated loops of small bowel and a 6mm right middle lobe pulmonary nodule. Pt was started on warming blanket, insulin gtt, IVF, antibiotics, hyperkalemia protocol, and transferred to [facility] ICU. Pt was found to have a L plantar diabetic ulcer. Renal, DTC, and WOCN were consulted. DKA resolved and insulin gtt was stopped this morning. Hospitalist service was consulted for transfer of care.       DISCHARGE DIAGNOSES / PLAN:       DKA  T1DM, uncontrolled with medication nonadherence  - A1c 11.7%  - DKA resolved s/p insulin gtt, IVF  - DTC consulted and recommended for discharge: Lantus 25u Daily, lispro correction regimen  - Follow up with endocrinologist Dr. Teja Sofia     Hypothyroidism  - Continue levothyroxine, follow up with endocrinologist     ÁNGEL (POA): Resolved  - likely due to volume depletion  - Renal consulted  - Cr normalized     Hyperkalemia  - resolved s/p hyperkalemia protocol     Hypothermia (POA)  - resolved s/p warming blanket  - PT/OT evals as pt was found outside unresponsive     Hypotension  Leucocytosis  Lactic acidosis  - lactate now WNL  - leucocytosis trending down; likely reactive  - procal elevated  - BC 10/10 NGTD x2  - Continue to hold off on abx     Hyperphosphatemia, resolved     RML pulmonary nodule, 6mm  - noted on CT     L plantar diabetic ulcer (POA)  - wound care as per WOCN     Dilated loops of small bowel concerning for SBO  - noted on CTAP 10/10  - pt currently tolerating full liquid diet, SLP following, will advance per their recs  - KUB: decreased gastric distention     Constipation  - noted on CTAP  - bowel regimen        CONSULTATIONS: IP WOUND CARE NURSE CONSULT TO EVAL  IP CONSULT TO DIABETES MANAGEMENT  IP CONSULT TO DIABETES MANAGEMENT    PROCEDURES/SURGERIES: * No surgery found *    PENDING TEST RESULTS:   At the time of discharge the following test results are still pending: none    FOLLOW UP APPOINTMENTS:   [unfilled]     ADDITIONAL CARE RECOMMENDATIONS: Follow up with PCP and Endocrinologist     DIET: diabetic diet     ACTIVITY: activity as tolerated     WOUND CARE: Per WOCN     EQUIPMENT needed: Cane      DISCHARGE MEDICATIONS:   See Medication Reconciliation Form    It is important that you take the medication exactly as they are prescribed. Keep your medication in the bottles provided by the pharmacist and keep a list of the medication names, dosages, and times to be taken in your wallet. Do not take other medications without consulting your doctor. NOTIFY YOUR PHYSICIAN FOR ANY OF THE FOLLOWING:   Fever over 101 degrees for 24 hours. Chest pain, shortness of breath, fever, chills, nausea, vomiting, diarrhea, change in mentation, falling, weakness, bleeding. Severe pain or pain not relieved by medications. Or, any other signs or symptoms that you may have questions about.       DISPOSITION:   x Home With:   OT  PT  HH  RN       SNF/Inpatient Rehab/LTAC    Independent/assisted living    Hospice    Other:     CDMP Checked:   Yes x     PROBLEM LIST Updated:  Yes x       Signed:   Abimael Mcgarry

## 2023-10-13 NOTE — PLAN OF CARE
Problem: Discharge Planning  Goal: Discharge to home or other facility with appropriate resources  10/13/2023 1230 by Malinda Tafoya RN  Outcome: Adequate for Discharge  10/13/2023 1134 by Malinda Tafoya RN  Outcome: Progressing     Problem: Safety - Adult  Goal: Free from fall injury  10/13/2023 1230 by Malinda Tafoya RN  Outcome: Adequate for Discharge  10/13/2023 1134 by Malinda Tafoya RN  Outcome: Progressing

## 2023-10-14 LAB
EKG ATRIAL RATE: 85 BPM
EKG DIAGNOSIS: NORMAL
EKG P AXIS: 53 DEGREES
EKG P-R INTERVAL: 136 MS
EKG Q-T INTERVAL: 368 MS
EKG QRS DURATION: 90 MS
EKG QTC CALCULATION (BAZETT): 437 MS
EKG R AXIS: 100 DEGREES
EKG T AXIS: 78 DEGREES
EKG VENTRICULAR RATE: 85 BPM

## 2023-10-14 PROCEDURE — 93010 ELECTROCARDIOGRAM REPORT: CPT | Performed by: INTERNAL MEDICINE

## 2023-10-15 LAB
BACTERIA SPEC CULT: NORMAL
BACTERIA SPEC CULT: NORMAL
SERVICE CMNT-IMP: NORMAL
SERVICE CMNT-IMP: NORMAL

## 2024-02-26 ENCOUNTER — OFFICE VISIT (OUTPATIENT)
Age: 54
End: 2024-02-26
Payer: COMMERCIAL

## 2024-02-26 VITALS
DIASTOLIC BLOOD PRESSURE: 96 MMHG | SYSTOLIC BLOOD PRESSURE: 189 MMHG | HEART RATE: 88 BPM | BODY MASS INDEX: 25.81 KG/M2 | WEIGHT: 184.4 LBS | HEIGHT: 71 IN

## 2024-02-26 DIAGNOSIS — E10.11 TYPE 1 DIABETES MELLITUS WITH KETOACIDOTIC COMA (HCC): Primary | ICD-10-CM

## 2024-02-26 DIAGNOSIS — E10.9 INSULIN DEPENDENT DIABETES MELLITUS TYPE IA (HCC): ICD-10-CM

## 2024-02-26 DIAGNOSIS — E06.3 HYPOTHYROIDISM DUE TO HASHIMOTO'S THYROIDITIS: ICD-10-CM

## 2024-02-26 DIAGNOSIS — E03.8 HYPOTHYROIDISM DUE TO HASHIMOTO'S THYROIDITIS: ICD-10-CM

## 2024-02-26 LAB — HBA1C MFR BLD: 5.6 %

## 2024-02-26 PROCEDURE — 83036 HEMOGLOBIN GLYCOSYLATED A1C: CPT | Performed by: INTERNAL MEDICINE

## 2024-02-26 PROCEDURE — 99204 OFFICE O/P NEW MOD 45 MIN: CPT | Performed by: INTERNAL MEDICINE

## 2024-02-26 RX ORDER — FENOFIBRATE 145 MG/1
145 TABLET, COATED ORAL DAILY
Qty: 90 TABLET | Refills: 3 | Status: SHIPPED | OUTPATIENT
Start: 2024-02-26

## 2024-02-26 NOTE — PROGRESS NOTES
Chief Complaint   Patient presents with    New Patient    Diabetes       HPI  This is is a 53-year-old gentleman with a history of type 1 diabetes mellitus x 50 years who is referred for evaluation and management.  He tells me that he has been followed by an endocrinologist most of his life but not in the last several years.  He has been on and off pumps and was on pump continuously from 3226-4829 and then intermittently thereafter.  He was at one point on a tandem pump with a Dexcom G6 but became allergic to this adhesive for the G6 so he stopped.  He does tell me that when he has been on the pump he has been very well-controlled.  He had an event in October 2023 when he was administering what he thought was insulin but he had an empty vial and he became severely hyperglycemic and was admitted to [facility] with a glucose of 1275, bicarb of 5, anion gap of 35 and A1c of 11.7%.  He was discharged on basal bolus insulin has been on this ever since.  He presents today with an A1c of 5.6%.    Past Medical History  Diabetic retinopathy  Diabetic neuropathy with a left foot ulcer, chronic   Primary hypothyroidism on levothyroxine 150 mcg daily    He works for the [employer].  He lives with his son and his wife who is a clinical pharmacist at [facility].  His diet is very regimented.  Breakfast is almost always oatmeal (about 60 g of carb).  Lunch can be soup with noodles or about 40 to 50 g of carb.  Dinner can be a stirfry with a small amount of rice or soup.  Generally that meal is 20 to 40 g of carb.    Current Diabetes Regimen  Lantus 26 units in the morning  Humalog 1-10 AM, 1: 15 for lunch. He omits for dinner most evenings  Correction 1:30 AM, 1:50 later in the day  Dexcom G7 (he is not allergic to this adhesive)        We reviewed his Dexcom data.  He has very rare episodes of hypoglycemia and as noted above, he is in range most of the time.    ROS  Review of Systems - History obtained

## 2024-02-27 RX ORDER — ACYCLOVIR 400 MG/1
TABLET ORAL
Qty: 9 EACH | Refills: 4 | Status: SHIPPED | OUTPATIENT
Start: 2024-02-27

## 2024-02-27 RX ORDER — LEVOTHYROXINE SODIUM 0.15 MG/1
150 TABLET ORAL
Qty: 30 TABLET | Refills: 0 | Status: SHIPPED | OUTPATIENT
Start: 2024-02-27

## 2024-03-05 DIAGNOSIS — E10.11 TYPE 1 DIABETES MELLITUS WITH KETOACIDOTIC COMA (HCC): Primary | ICD-10-CM

## 2024-03-05 RX ORDER — INSULIN LISPRO 100 [IU]/ML
6 INJECTION, SOLUTION SUBCUTANEOUS
Qty: 30 ML | Refills: 3 | Status: SHIPPED | OUTPATIENT
Start: 2024-03-05

## 2024-03-25 DIAGNOSIS — E06.3 HYPOTHYROIDISM DUE TO HASHIMOTO'S THYROIDITIS: ICD-10-CM

## 2024-03-25 DIAGNOSIS — E03.8 HYPOTHYROIDISM DUE TO HASHIMOTO'S THYROIDITIS: ICD-10-CM

## 2024-03-25 RX ORDER — LEVOTHYROXINE SODIUM 0.15 MG/1
150 TABLET ORAL
Qty: 90 TABLET | Refills: 3 | Status: SHIPPED | OUTPATIENT
Start: 2024-03-25

## 2024-05-20 ENCOUNTER — OFFICE VISIT (OUTPATIENT)
Age: 54
End: 2024-05-20

## 2024-05-20 VITALS
DIASTOLIC BLOOD PRESSURE: 86 MMHG | SYSTOLIC BLOOD PRESSURE: 158 MMHG | OXYGEN SATURATION: 99 % | RESPIRATION RATE: 18 BRPM | BODY MASS INDEX: 25.34 KG/M2 | HEART RATE: 85 BPM | HEIGHT: 71 IN | WEIGHT: 181 LBS

## 2024-05-20 DIAGNOSIS — E10.11 TYPE 1 DIABETES MELLITUS WITH KETOACIDOTIC COMA (HCC): ICD-10-CM

## 2024-05-20 DIAGNOSIS — E03.8 HYPOTHYROIDISM DUE TO HASHIMOTO'S THYROIDITIS: ICD-10-CM

## 2024-05-20 DIAGNOSIS — E06.3 HYPOTHYROIDISM DUE TO HASHIMOTO'S THYROIDITIS: ICD-10-CM

## 2024-05-20 DIAGNOSIS — E10.11 TYPE 1 DIABETES MELLITUS WITH KETOACIDOTIC COMA (HCC): Primary | ICD-10-CM

## 2024-05-20 LAB — HBA1C MFR BLD: 5.6 %

## 2024-05-20 RX ORDER — INSULIN ADMIN. SUPPLIES
1 INSULIN PEN (EA) SUBCUTANEOUS DAILY
Qty: 1 EACH | Refills: 0 | Status: SHIPPED | OUTPATIENT
Start: 2024-05-20

## 2024-05-20 RX ORDER — INSULIN ASPART 100 [IU]/ML
INJECTION, SOLUTION INTRAVENOUS; SUBCUTANEOUS
Qty: 30 ML | Refills: 3 | Status: SHIPPED | OUTPATIENT
Start: 2024-05-20

## 2024-05-20 NOTE — PROGRESS NOTES
This is is a 53-year-old gentleman with a history of type 1 diabetes mellitus x 50 years who is referred for evaluation and management.  He tells me that he has been followed by an endocrinologist most of his life but not in the last several years.  He has been on and off pumps and was on pump continuously from 1705-3384 and then intermittently thereafter.  He was at one point on a tandem pump with a Dexcom G6 but became allergic to this adhesive for the G6 so he stopped.  He does tell me that when he has been on the pump he has been very well-controlled.  He had an event in October 2023 when he was administering what he thought was insulin but he had an empty vial and he became severely hyperglycemic and was admitted to [facility] with a glucose of 1275, bicarb of 5, anion gap of 35 and A1c of 11.7%.  He was discharged on basal bolus insulin has been on this ever since.  He presents today with an A1c of 5.6%.     Past Medical History  Diabetic retinopathy  Diabetic neuropathy with a left foot ulcer, chronic   Primary hypothyroidism on levothyroxine 150 mcg daily     He works for the [employer].  He lives with his son and his wife who is a clinical pharmacist at [facility].  His diet is very regimented.  Breakfast is almost always oatmeal (about 60 g of carb).  Lunch can be soup with noodles or about 40 to 50 g of carb.  Dinner can be a stirfry with a small amount of rice or soup.  Generally that meal is 20 to 40 g of carb.     Current Diabetes Regimen  Lantus 26 units in the morning  Humalog 1-10 AM, 1: 15 for lunch and dinner  Correction 1:30 AM, 1:50 later in the day  Dexcom G7 (he is not allergic to this adhesive)    He presents today having increased his Lantus a little bit and having more hypoglycemia.  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  In addition, he was not bolusing for dinner before and is now bolusing for dinner as well.  None of the

## 2024-05-21 LAB
ALBUMIN SERPL-MCNC: 3.4 G/DL (ref 3.5–5)
ALBUMIN/GLOB SERPL: 1 (ref 1.1–2.2)
ALP SERPL-CCNC: 95 U/L (ref 45–117)
ALT SERPL-CCNC: 27 U/L (ref 12–78)
ANION GAP SERPL CALC-SCNC: 3 MMOL/L (ref 5–15)
AST SERPL-CCNC: 20 U/L (ref 15–37)
BILIRUB SERPL-MCNC: 0.4 MG/DL (ref 0.2–1)
BUN SERPL-MCNC: 26 MG/DL (ref 6–20)
BUN/CREAT SERPL: 26 (ref 12–20)
CALCIUM SERPL-MCNC: 9.2 MG/DL (ref 8.5–10.1)
CHLORIDE SERPL-SCNC: 108 MMOL/L (ref 97–108)
CHOLEST SERPL-MCNC: 185 MG/DL
CO2 SERPL-SCNC: 30 MMOL/L (ref 21–32)
CREAT SERPL-MCNC: 1.01 MG/DL (ref 0.7–1.3)
CREAT UR-MCNC: 150 MG/DL
ERYTHROCYTE [DISTWIDTH] IN BLOOD BY AUTOMATED COUNT: 13.1 % (ref 11.5–14.5)
GLOBULIN SER CALC-MCNC: 3.4 G/DL (ref 2–4)
GLUCOSE SERPL-MCNC: 65 MG/DL (ref 65–100)
HCT VFR BLD AUTO: 32.5 % (ref 36.6–50.3)
HDLC SERPL-MCNC: 64 MG/DL
HDLC SERPL: 2.9 (ref 0–5)
HGB BLD-MCNC: 10.8 G/DL (ref 12.1–17)
LDLC SERPL CALC-MCNC: 106.2 MG/DL (ref 0–100)
MCH RBC QN AUTO: 29.3 PG (ref 26–34)
MCHC RBC AUTO-ENTMCNC: 33.2 G/DL (ref 30–36.5)
MCV RBC AUTO: 88.3 FL (ref 80–99)
MICROALBUMIN UR-MCNC: 115 MG/DL
MICROALBUMIN/CREAT UR-RTO: 767 MG/G (ref 0–30)
NRBC # BLD: 0 K/UL (ref 0–0.01)
NRBC BLD-RTO: 0 PER 100 WBC
PLATELET # BLD AUTO: 279 K/UL (ref 150–400)
PMV BLD AUTO: 10.2 FL (ref 8.9–12.9)
POTASSIUM SERPL-SCNC: 4 MMOL/L (ref 3.5–5.1)
PROT SERPL-MCNC: 6.8 G/DL (ref 6.4–8.2)
RBC # BLD AUTO: 3.68 M/UL (ref 4.1–5.7)
SODIUM SERPL-SCNC: 141 MMOL/L (ref 136–145)
T4 FREE SERPL-MCNC: 1.2 NG/DL (ref 0.8–1.5)
TRIGL SERPL-MCNC: 74 MG/DL
TSH SERPL DL<=0.05 MIU/L-ACNC: 4.24 UIU/ML (ref 0.36–3.74)
VLDLC SERPL CALC-MCNC: 14.8 MG/DL
WBC # BLD AUTO: 5.2 K/UL (ref 4.1–11.1)

## 2024-05-21 RX ORDER — INSULIN GLARGINE 100 [IU]/ML
INJECTION, SOLUTION SUBCUTANEOUS
Qty: 30 ML | Refills: 3 | Status: SHIPPED | OUTPATIENT
Start: 2024-05-21

## 2024-05-28 ENCOUNTER — TELEPHONE (OUTPATIENT)
Age: 54
End: 2024-05-28

## 2024-05-28 DIAGNOSIS — E10.11 TYPE 1 DIABETES MELLITUS WITH KETOACIDOTIC COMA (HCC): Primary | ICD-10-CM

## 2024-05-28 RX ORDER — BLOOD GLUC MTR/INSULIN DATA BT
1 KIT MISCELLANEOUS DAILY
Qty: 1 KIT | Refills: 0 | Status: SHIPPED | OUTPATIENT
Start: 2024-05-28

## 2024-05-28 NOTE — TELEPHONE ENCOUNTER
Received a fax from [facility] stating the pt's insurance covers Humalog cartridge as well as the Tempo smart button product to go along with it

## 2024-10-04 ENCOUNTER — OFFICE VISIT (OUTPATIENT)
Age: 54
End: 2024-10-04

## 2024-10-04 VITALS
WEIGHT: 185.8 LBS | HEART RATE: 85 BPM | SYSTOLIC BLOOD PRESSURE: 154 MMHG | HEIGHT: 71 IN | DIASTOLIC BLOOD PRESSURE: 77 MMHG | BODY MASS INDEX: 26.01 KG/M2

## 2024-10-04 DIAGNOSIS — E10.11 TYPE 1 DIABETES MELLITUS WITH KETOACIDOTIC COMA (HCC): Primary | ICD-10-CM

## 2024-10-04 LAB — HBA1C MFR BLD: 5.8 %

## 2024-10-04 RX ORDER — CAPTOPRIL 12.5 MG/1
12.5 TABLET ORAL
Qty: 90 TABLET | Refills: 3 | Status: SHIPPED | OUTPATIENT
Start: 2024-10-04

## 2024-10-04 NOTE — PROGRESS NOTES
This is is a 53-year-old gentleman with a history of type 1 diabetes mellitus x 50 years who is referred for evaluation and management.  He tells me that he has been followed by an endocrinologist most of his life but not in the last several years.  He has been on and off pumps and was on pump continuously from 1707-3086 and then intermittently thereafter.  He was at one point on a tandem pump with a Dexcom G6 but became allergic to this adhesive for the G6 so he stopped.  He does tell me that when he has been on the pump he has been very well-controlled.  He had an event in October 2023 when he was administering what he thought was insulin but he had an empty vial and he became severely hyperglycemic and was admitted to [facility] with a glucose of 1275, bicarb of 5, anion gap of 35 and A1c of 11.7%.  He was discharged on basal bolus insulin has been on this ever since.  He presents today with an A1c of 5.8%.     Past Medical History  Diabetic retinopathy  Diabetic neuropathy with a left 3rd MTP plantar foot ulcer, chronic x 4 years  Microalbuminuria  Primary hypothyroidism on levothyroxine 150 mcg daily     He works for the [employer].  He lives with his son and his wife who is a clinical pharmacist at [facility].  His diet is very regimented.  Breakfast is almost always oatmeal (about 60 g of carb).  Lunch can be soup with noodles or about 40 to 50 g of carb.  Dinner can be a stirfry with a small amount of rice or soup.  Generally that meal is 20 to 40 g of carb.     Current Diabetes Regimen  Lantus 26 units in the morning  Humalog 1-10 AM, 1: 15 for lunch and dinner  Correction 1:30 AM, 1:50 later in the day  Dexcom G7 (he is not allergic to this adhesive)    He tells me today that he is experiencing some episodes worried requires multiple injections of short acting insulin to get his blood sugar down after a meal.  The injection sites were he gives almost all of his insulin is in the

## 2025-02-18 DIAGNOSIS — E10.11 TYPE 1 DIABETES MELLITUS WITH KETOACIDOTIC COMA (HCC): ICD-10-CM

## 2025-02-18 RX ORDER — FENOFIBRATE 145 MG/1
145 TABLET, COATED ORAL DAILY
Qty: 90 TABLET | Refills: 3 | Status: SHIPPED | OUTPATIENT
Start: 2025-02-18

## 2025-02-28 DIAGNOSIS — E10.11 TYPE 1 DIABETES MELLITUS WITH KETOACIDOTIC COMA (HCC): ICD-10-CM

## 2025-02-28 RX ORDER — ACYCLOVIR 400 MG/1
TABLET ORAL
Qty: 9 EACH | Refills: 4 | Status: SHIPPED | OUTPATIENT
Start: 2025-02-28

## 2025-03-27 DIAGNOSIS — E06.3 HYPOTHYROIDISM DUE TO HASHIMOTO'S THYROIDITIS: ICD-10-CM

## 2025-03-27 RX ORDER — LEVOTHYROXINE SODIUM 150 UG/1
150 TABLET ORAL
Qty: 90 TABLET | Refills: 3 | Status: SHIPPED | OUTPATIENT
Start: 2025-03-27

## 2025-04-02 ENCOUNTER — OFFICE VISIT (OUTPATIENT)
Age: 55
End: 2025-04-02
Payer: COMMERCIAL

## 2025-04-02 VITALS
DIASTOLIC BLOOD PRESSURE: 81 MMHG | HEIGHT: 71 IN | HEART RATE: 81 BPM | BODY MASS INDEX: 26.6 KG/M2 | SYSTOLIC BLOOD PRESSURE: 180 MMHG | WEIGHT: 190 LBS

## 2025-04-02 DIAGNOSIS — E10.11 TYPE 1 DIABETES MELLITUS WITH KETOACIDOTIC COMA (HCC): Primary | ICD-10-CM

## 2025-04-02 LAB — HBA1C MFR BLD: 5.9 %

## 2025-04-02 PROCEDURE — 3044F HG A1C LEVEL LT 7.0%: CPT | Performed by: INTERNAL MEDICINE

## 2025-04-02 PROCEDURE — 99214 OFFICE O/P EST MOD 30 MIN: CPT | Performed by: INTERNAL MEDICINE

## 2025-04-02 PROCEDURE — 83036 HEMOGLOBIN GLYCOSYLATED A1C: CPT | Performed by: INTERNAL MEDICINE

## 2025-04-02 PROCEDURE — 95251 CONT GLUC MNTR ANALYSIS I&R: CPT | Performed by: INTERNAL MEDICINE

## 2025-04-02 RX ORDER — LOSARTAN POTASSIUM 25 MG/1
25 TABLET ORAL DAILY
Qty: 90 TABLET | Refills: 1 | Status: SHIPPED | OUTPATIENT
Start: 2025-04-02

## 2025-04-02 NOTE — PROGRESS NOTES
This is is a 54-year-old gentleman with a history of type 1 diabetes mellitus x 50 years who is referred for evaluation and management.  He tells me that he has been followed by an endocrinologist most of his life but not in the last several years.  He has been on and off pumps and was on pump continuously from 6619-6610 and then intermittently thereafter.  He was at one point on a tandem pump with a Dexcom G6 but became allergic to this adhesive for the G6 so he stopped.  He does tell me that when he has been on the pump he has been very well-controlled.  He had an event in October 2023 when he was administering what he thought was insulin but he had an empty vial and he became severely hyperglycemic and was admitted to [facility] with a glucose of 1275, bicarb of 5, anion gap of 35 and A1c of 11.7%.  He was discharged on basal bolus insulin has been on this ever since.  He presents today with an A1c of 5.9%.     Past Medical History  Diabetic retinopathy  Diabetic neuropathy with a left 3rd MTP plantar foot ulcer, chronic x 4 years  Microalbuminuria  Primary hypothyroidism on levothyroxine 150 mcg daily     He works for the [employer].  He lives with his son and his wife who is a clinical pharmacist at [facility].  His diet is very regimented.  Breakfast is almost always oatmeal (about 60 g of carb).  Lunch can be soup with noodles or about 40 to 50 g of carb.  Dinner can be a stirfry with a small amount of rice or soup.  Generally that meal is 20 to 40 g of carb.     Current Diabetes Regimen  Lantus 25 units in the morning  Humalog 1-10 AM, 1: 15 for lunch and dinner  Correction 1:30 AM, 1:50 later in the day  Dexcom G7 (he is not allergic to this adhesive)        This gentleman tells me today that his ulcer is finally healing and he is optimistic about that.  At our last visit, we also identified areas of lipohypertrophy and he is avoiding those areas which clearly is improving his

## 2025-05-29 DIAGNOSIS — E10.11 TYPE 1 DIABETES MELLITUS WITH KETOACIDOTIC COMA (HCC): ICD-10-CM

## 2025-05-30 RX ORDER — INSULIN LISPRO 100 [IU]/ML
INJECTION, SOLUTION SUBCUTANEOUS
Qty: 30 ML | Refills: 3 | Status: SHIPPED | OUTPATIENT
Start: 2025-05-30